# Patient Record
Sex: FEMALE | Race: WHITE
[De-identification: names, ages, dates, MRNs, and addresses within clinical notes are randomized per-mention and may not be internally consistent; named-entity substitution may affect disease eponyms.]

---
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
## 2019-01-24 NOTE — EDPHY
H & P


Time Seen by Provider: 01/24/19 04:18


HPI/ROS: 





Chief Complaint:  Back pain





HPI:  58-year-old woman who I saw earlier in the shift for complaints of back 

pain.  She is atraumatic exacerbation of her chronic back pain.  She is given 

ibuprofen and Lidoderm patch.  Patient was ambulating and pain was controlled 

in the emergency department at time of discharge.  Patient states she has been 

in the waiting room waiting for a cab in her neck pain is been getting worse.  

She says it now hurts to move and she feels she can't walk.  No new numbness or 

weakness.  No new falls.





ROS:  10 systems were reviewed and were negative except those elements noted in 

the HPI.





PMH:  Chronic back pain, anxiety





Social History: No smoking, no alcohol,  no recreational drug use





Family History: non-contributory





Physical Exam:


Gen: Awake, Alert, No Distress


HEENT:  


     Nose: no rhinorrhea


     Eyes: PERRLA, EOMI


     Mouth: Moist mucosa 


Neck: Supple, no JVD


Chest: nontender, lungs clear to auscultation


Heart: S1, S2 normal, no murmur


Abd: Soft, non-tender, no guarding


Back: no CVA tenderness, no midline tenderness, mild diffuse right lower 

tenderness without spasm


Ext: no edema, non-tender


Skin: no rash


Neuro: CN II-XII intact, Sensation grossly intact, Strength 5/5 in bilateral 

upper and lower extremities, toes are downgoing, 2+ deep tendon reflexes.  Five 

in 5 dorsi and plantar flexion.  Sensation intact in all dermatomes.








- Medical/Surgical History


Hx Asthma: No


Hx Chronic Respiratory Disease: No


Hx Diabetes: No


Hx Cardiac Disease: No


Hx Renal Disease: No


Hx Cirrhosis: No


Hx Alcoholism: No


Hx HIV/AIDS: No


Hx Splenectomy or Spleen Trauma: No





- Social History


Smoking Status: Never smoked


Constitutional: 


 Initial Vital Signs











Temperature (C)  36.3 C   01/24/19 04:23


 


Heart Rate  105 H  01/24/19 04:23


 


Respiratory Rate  16   01/24/19 04:23


 


Blood Pressure  179/91 H  01/24/19 04:23


 


O2 Sat (%)  96   01/24/19 04:23








 











O2 Delivery Mode               Room Air














Allergies/Adverse Reactions: 


 





No Known Allergies Allergy (Unverified 01/24/19 00:16)


 








Home Medications: 














 Medication  Instructions  Recorded


 


Hydrochlorothiazide  01/24/19


 


Lisinopril  01/24/19














Medical Decision Making


ED Course/Re-evaluation: 





Patient Re presenting with exacerbation of chronic back pain.  Will treat her 

with Norco here and reassess.  She has already had ibuprofen and Lidoderm patch.





Patient is still complaining of pain.  I performed a trigger-point injection 

with bupivacaine.  She has been able to stand and ambulate several steps but 

states that the pain is too great.  Will reassessed her after bupivacaine has a 

chance to take effect.





Patient is improved.  She is walking with me unassisted emergency department.  

She is completely neurologically intact.  Will discharge with follow-up with 

spine doc, she would like to go to a chiropractor which is her certainly an 

option.  Will send her with a few Norco to go.





- Data Points


Medications Given: 


 








Discontinued Medications





Hydrocodone Bitart/Acetaminophen (Norco 5/325)  2 tab PO EDNOW ONE


   Stop: 01/24/19 04:19


   Last Admin: 01/24/19 04:23 Dose:  2 tab








Departure





- Departure


Disposition: Home, Routine, Self-Care


Clinical Impression: 


 Back pain





Condition: Good


Instructions:  Back Pain (ED), Hydrocodone/Acetaminophen (By mouth)


Additional Instructions: 


Take ibuprofen, 600 mg, 3 times a day.


You may take Norco as needed for breakthrough pain.


You may replace a Lidoderm patch every 24 hr, these are available over-the-

counter.


Apply ice for 15 minutes of every hour while awake.


Make sure to remain active.  Did do not lay in bed or sit in a chair for long 

periods.  


Avoid heavy lifting or bending at work until cleared by your physician.


Please see the attached back exercise instructions.


Follow up with your primary care physician in 2-3 days for further evaluation.


Referrals: 


SHUKRI Wallace MD [Medical Doctor] - As per Instructions


Henrique Rankin MD [Medical Doctor] - As per Instructions

## 2019-02-04 ENCOUNTER — HOSPITAL ENCOUNTER (INPATIENT)
Dept: HOSPITAL 80 - FED | Age: 59
LOS: 14 days | Discharge: HOME HEALTH | DRG: 710 | End: 2019-02-18
Attending: INTERNAL MEDICINE | Admitting: INTERNAL MEDICINE
Payer: MEDICAID

## 2019-02-04 DIAGNOSIS — K68.12: ICD-10-CM

## 2019-02-04 DIAGNOSIS — L02.31: ICD-10-CM

## 2019-02-04 DIAGNOSIS — R78.81: ICD-10-CM

## 2019-02-04 DIAGNOSIS — G89.29: ICD-10-CM

## 2019-02-04 DIAGNOSIS — D64.9: ICD-10-CM

## 2019-02-04 DIAGNOSIS — E88.09: ICD-10-CM

## 2019-02-04 DIAGNOSIS — M12.869: ICD-10-CM

## 2019-02-04 DIAGNOSIS — R73.9: ICD-10-CM

## 2019-02-04 DIAGNOSIS — N17.9: ICD-10-CM

## 2019-02-04 DIAGNOSIS — I10: ICD-10-CM

## 2019-02-04 DIAGNOSIS — G06.2: ICD-10-CM

## 2019-02-04 DIAGNOSIS — E87.6: ICD-10-CM

## 2019-02-04 DIAGNOSIS — N39.0: ICD-10-CM

## 2019-02-04 DIAGNOSIS — E66.9: ICD-10-CM

## 2019-02-04 DIAGNOSIS — G93.41: ICD-10-CM

## 2019-02-04 DIAGNOSIS — M25.469: ICD-10-CM

## 2019-02-04 DIAGNOSIS — M70.22: ICD-10-CM

## 2019-02-04 DIAGNOSIS — A41.01: Primary | ICD-10-CM

## 2019-02-04 DIAGNOSIS — E86.0: ICD-10-CM

## 2019-02-04 DIAGNOSIS — G06.1: ICD-10-CM

## 2019-02-04 DIAGNOSIS — E87.1: ICD-10-CM

## 2019-02-04 DIAGNOSIS — R65.21: ICD-10-CM

## 2019-02-04 LAB
CK SERPL-CCNC: 95 IU/L (ref 0–156)
PLATELET # BLD: 432 10^3/UL (ref 150–400)

## 2019-02-04 PROCEDURE — P9016 RBC LEUKOCYTES REDUCED: HCPCS

## 2019-02-04 PROCEDURE — C1751 CATH, INF, PER/CENT/MIDLINE: HCPCS

## 2019-02-04 PROCEDURE — 0S9D3ZX DRAINAGE OF LEFT KNEE JOINT, PERCUTANEOUS APPROACH, DIAGNOSTIC: ICD-10-PCS

## 2019-02-04 PROCEDURE — A9585 GADOBUTROL INJECTION: HCPCS

## 2019-02-04 RX ADMIN — SODIUM CHLORIDE SCH MLS: 900 INJECTION, SOLUTION INTRAVENOUS at 17:49
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
## 2019-02-04 NOTE — HOSPPROG
Hospitalist Progress Note


Assessment/Plan: 


I have personally seen and evaluated Ms. Caitlin Cortes.   I agree with the 

assessment and plan as outlined by PATiny, in a separate note.  


Objective: 


 Vital Signs











Temp Pulse Resp BP Pulse Ox


 


 36.5 C   90   20   107/62   94 


 


 02/04/19 17:38  02/04/19 18:00  02/04/19 18:00  02/04/19 18:00  02/04/19 18:00








 Laboratory Results





 02/04/19 18:25 











ICD10 Worksheet


Patient Problems: 


 Problems











Problem Status Onset


 


Effusion, left knee Acute  


 


Hypokalemia Acute  


 


Hyponatremia Acute  


 


Weakness Acute

## 2019-02-04 NOTE — EDPHY
H & P





- Medical/Surgical History


Hx Asthma: No


Hx Chronic Respiratory Disease: No


Hx Diabetes: No


Hx Cardiac Disease: No


Hx Renal Disease: No


Hx Cirrhosis: No


Hx Alcoholism: No


Hx HIV/AIDS: No


Hx Splenectomy or Spleen Trauma: No


Other PMH: BACK PAIN





- Social History


Smoking Status: Never smoked


Time Seen by Provider: 02/04/19 15:25


HPI/ROS: 





CHIEF COMPLAINT: Left leg pain





HISTORY OF PRESENT ILLNESS: The patient is a 59 y/o female with a history of 

chronic back pain, hypertension, and anxiety arriving via EMS from her doctor's 

office for evaluation of left leg and knee pain. She was seen twice in our ED 2 

weeks ago for back pain and was treated with Norco, ibuprofen, Lidoderm patches

, and bupivacaine trigger point injections. Her pain has moved around since 

then and is currently located primarily along the lateral aspect of her left 

thigh and knee. Her knee pain feels "really tight, like it's almost wrapped." 

She feels she is getting weaker in addition to the pain and has been unable to 

get out of bed to even use the toilet and is now wearing Depends due to this, 

though she denies urinary or bowel incontinence. She reports decreased oral 

intake over the last 2 weeks as well. Her boss has been bringing her food. She 

says she is normally independent and drives. She saw Dr. Carbajal today for her 

first visit to establish primary care and it sounds like she was ultimately 

referred to the ED for imaging studies of some sort, but the patient cannot 

provide further information on this. She denies falls, trauma, fever, abdominal 

pain, vomiting, diarrhea. She also mentions she developed a rash while taking 

Advil and discontinued this a week ago. 





REVIEW OF SYSTEMS:


A ten system review of systems was performed and is negative with the exception 

of the items mentioned in the HPI.





Past medical history: Chronic back pain, anxiety, hypertension





Past surgical history: Noncontributory





Family history: Noncontributory





Social history: Nonsmoker, rare alcohol, no illicit drug use.  She works as a 

caregiver for an elderly person.





General Appearance:  Alert. Appears much older than chronologic age. Vital 

signs reviewed.  


Eyes:  Pupils equal and round, no conjunctival injection, no discharge. 

Anicteric.


ENT, Mouth:  Mucous membranes are moist, no oropharyngeal erythema or edema.


Neck:  No lymphadenopathy, supple.


Respiratory:  Lungs are clear to auscultation; no wheezes, rales, or rhonchi.


Cardiovascular:  Regular rate and rhythm; no murmur, rub, or gallop.


Gastrointestinal:  Abdomen is soft and nontender, no masses or organomegaly.


Skin:  Warm and dry, no rashes on exposed skin, normal color.


Back:  Nontender to palpation over the thoracolumbar spine. No CVAT.


Extremities:  Warmth along lateral left knee, left knee more edematous than 

right. Erythema along left lateral malleolus. Warmth and erythema to dorsum of 

left hand. Left olecranon bursitis. No calf tenderness or swelling.


Neurological:  Alert and oriented.  Moving all four extremities easily and 

equally.  


Cranial nerves II through XII are examined and are intact (visual acuity not 

tested).  Unable to lift either leg off the bed or hold up against gravity, 

some contraction in right quadriceps while lying in the bed. Sensation is 

intact to light touch over all 4 extremities.  Deep tendon reflexes are 2+ in 

the right and left biceps and 0 in the knees bilaterally. 


Psychiatric:  Normal affect. (Sarah Lockett)


Constitutional: 


 Initial Vital Signs











Temperature (C)  36.5 C   02/04/19 15:29


 


Heart Rate  93   02/04/19 15:29


 


Respiratory Rate  18   02/04/19 15:29


 


Blood Pressure  94/59 L  02/04/19 15:29


 


O2 Sat (%)  95   02/04/19 15:29








 











O2 Delivery Mode               Room Air














Allergies/Adverse Reactions: 


 





No Known Allergies Allergy (Unverified 02/04/19 15:35)


 








Home Medications: 














 Medication  Instructions  Recorded


 


Hydrochlorothiazide [HCTZ (*)] 25 mg PO DAILY 01/24/19


 


Lisinopril [Zestril 10 mg (*)] 10 mg PO DAILY 02/04/19














Medical Decision Making





- Diagnostics


Imaging: I viewed and interpreted images myself


Procedures: 





Procedure: Arthrocentesis.


At the request is in supervision Dr. Sarah Lockett I was asked to perform 

diagnostic arthrocentesis


Indication: Evaluation for the possibility of septic joint.  Risks, benefits, 

alternatives of the procedure were discussed with the patient and verbal 

consent obtained.  


The patient was prepped and draped in the usual sterile fashion over the left 

knee joint.  Local anesthesia was provided with 1% lidocaine with epinephrine.  

The joint space was entered with an 18 gauge gauge needle and 15 cc of straw 

fluid was obtained.  There were no complications.  Synovial fluid sent to 

laboratory for evaluation.


The procedure was performed by myself. (RONDA Kelley)


ED Course/Re-evaluation: 


This is a 59 y/o female with a history of chronic back pain who presents 

primarily with left leg and knee pain and a 2-week history of weakness. She has 

warmth and erythema to the lateral aspect of her left knee on exam. She is 

quite weak and is either unable or unwilling to lift her legs off the bed. Plan 

for IV, labs, UA, knee x-ray. 





Consulted with Dr. Carbajal, patient's PCP. He sent patient to the ED due to 

concern for gout or infection in her right knee.





Left knee x-ray: effusion





WBC elevated at 39. Hyponatremic at 121, hypokalemic at 3.0. Chest x-ray 

ordered. Plan for slow fluid repletion and admission. 


She shows no signs of encehalopathy.  





Chest x-ray: nothing acute. 





Consulted with Dr. Barnes, hospitalist. He accepts admission. Plan for joint 

aspiration in ED to evaluate for septic arthritis vs gout. 





1730: BP 83/59. 


Lactate 2.6.


She meets sepsis criteria, but decision has been made, in discussion with 

admitting team, to hydrate gently because of her hyponatremia and risk of CPM.  

She is clinically volume depleted, I suspect that her low blood pressure is 

primarily volume depletion.  Admitting team aware that we are deviating from 

sepsis protocol.





Joint aspiration of right knee performed by RAJEEV Kelley. 


UA indicates infection. At this point, there appear to be two possible sources 

of infection--urine and knee.  She continues to complain of back pain and leg 

weakness.  Her weakness could be due to electrolyte abnormalities (low sodium 

and low potassium) but the possibility of other etiologies must be considered--

herniated disc, discitis, transverse myelitis, spinal cord compression from 

abscess).  Will discuss antibiotics with hospitalist. 





Patient is being admitted to SDU. 


1800:  /62.


2005: BP 86/52. Mentating normally. IVF infusing at rate ordered by hospitalist 

service. If she continues with hypotension will need to consider alternate IVF 

rate and/or pressors. (Sarah Lockett)


Critical Care Time: 





This patient received 60 minutes of critical care, exclusive of procedures 

performed and exclusive or care provided by physician assistant..  She was at 

risk of multi-system failure. (Sarah Lockett)





- Data Points


Laboratory Results: 


 Laboratory Results





 02/04/19 16:30 





 02/04/19 16:30 








Medications Given: 


 





Enoxaparin Sodium (Lovenox)  40 mg SC DAILY [facility]


   Stop: 08/04/19 10:59


   Last Admin: 02/05/19 11:24 Dose:  40 mg


Hydromorphone HCl (Dilaudid)  0.2 - 0.4 mg IVP Q4HRS PRN


   PRN Reason: Pain, Severe Unable to Take PO


   Stop: 02/14/19 19:10


   Last Admin: 02/07/19 07:52 Dose:  0.4 mg


Sodium Chloride (Ns)  1,000 mls @ 100 mls/hr IV CONT [facility]


   Stop: 08/03/19 17:44


   Last Admin: 02/09/19 09:04 Dose:  1,000 mls


Nafcillin Sodium 2 gm/ (Dextrose)  100 mls @ 100 mls/hr IV Q4HRS [facility]


   PRN Reason: Protocol


   Stop: 03/07/19 21:59


   Last Admin: 02/09/19 10:10 Dose:  100 mls


Methocarbamol 750 mg/ Sodium (Chloride)  57.5 mls @ 230 mls/hr IV Q8HRS PRN


   PRN Reason: SPASMS, unable to take po


   Stop: 08/06/19 08:07


   Last Admin: 02/07/19 08:53 Dose:  57.5 mls


Methocarbamol (Robaxin)  750 mg PO TID PRN


   PRN Reason: Spasms


   Stop: 08/05/19 21:59


   Last Admin: 02/07/19 21:31 Dose:  750 mg


Oxycodone HCl (Oxycodone Ir)  5 - 10 mg PO Q3HRS PRN


   PRN Reason: Pain, Severe Able to Take PO


   Stop: 02/14/19 19:10


   Last Admin: 02/09/19 08:36 Dose:  10 mg


Senna/Docusate Sodium (Senokot-S)  1 - 2 tab PO BID [facility]


   PRN Reason: Protocol


   Stop: 08/06/19 20:59


   Last Admin: 02/09/19 08:51 Dose:  Not Given





Discontinued Medications





Bacitracin (Bacitracin Syringe) Confirm Administered Dose 100,000 units IRR .STK

-MED ONE


   Stop: 02/06/19 12:20


   Last Admin: 02/06/19 13:02 Dose:  100,000 units


Bacitracin (Bacitracin Syringe) Confirm Administered Dose 100,000 units IRR .STK

-MED ONE


   Stop: 02/06/19 13:56


   Last Admin: 02/06/19 13:02 Dose:  100,000 units


Bupivacaine HCl (Sensorcaine 0.25% Sdv) Confirm Administered Dose 60 ml .ROUTE 

.STK-MED ONE


   Stop: 02/06/19 12:19


   Last Admin: 02/06/19 13:00 Dose:  20 ml


Cefazolin Sodium (Ancef Syringe) Confirm Administered Dose 1 gm .ROUTE .STK-MED 

ONE


   Stop: 02/06/19 13:56


   Last Admin: 02/06/19 13:02 Dose:  1 gm


Cefazolin Sodium (Ancef Syringe) Confirm Administered Dose 1 gm .ROUTE .STK-MED 

ONE


   Stop: 02/06/19 15:05


   Last Admin: 02/06/19 15:09 Dose:  1 gm


Chlorhexidine Gluconate (Hibiclens) Confirm Administered Dose 1 btl TP .STK-MED 

ONE


   Stop: 02/06/19 12:19


   Last Admin: 02/06/19 15:00 Dose:  1 btl


Diazepam (Valium)  5 mg PO ONCE ONE


   Stop: 02/05/19 19:46


   Last Admin: 02/05/19 20:00 Dose:  5 mg


Epinephrine HCl (Epinephrine) Confirm Administered Dose 1 mg .ROUTE .STK-MED ONE


   Stop: 02/06/19 12:19


   Last Admin: 02/06/19 13:00 Dose:  0.3 mg


Fentanyl (Sublimaze)  25 - 100 mcg IVP Q5M PRN


   PRN Reason: PACU, IMMEDIATE Pain control


   Stop: 02/06/19 17:44


   Last Admin: 02/06/19 17:08 Dose:  50 mcg


Fentanyl (Sublimaze)  0 mcg IVP ONCALL PRN


   PRN Reason: Per provider during procedure


   Stop: 02/07/19 11:13


   Last Admin: 02/07/19 14:08 Dose:  200 mcg


Gentamicin Sulfate (Garamycin) Confirm Administered Dose 160 mg .ROUTE .STK-MED 

ONE


   Stop: 02/06/19 13:55


   Last Admin: 02/06/19 13:02 Dose:  160 mg


Gentamicin Sulfate (Garamycin) Confirm Administered Dose 80 mg .ROUTE .STK-MED 

ONE


   Stop: 02/06/19 14:32


   Last Admin: 02/06/19 15:03 Dose:  Not Given


Ceftriaxone Sodium/Dextrose (Rocephin 1 Gm (Premix))  50 mls @ 100 mls/hr IV 

DAILY [facility]


   PRN Reason: Protocol


   Stop: 03/06/19 18:59


   Last Admin: 02/05/19 11:26 Dose:  50 mls


Vancomycin HCl 1.25 gm/ (Dextrose)  250 mls @ 166.67 mls/hr IV Q12H ONE


   PRN Reason: Protocol


   Stop: 02/05/19 10:04


   Last Admin: 02/05/19 09:13 Dose:  250 mls


Sodium Chloride (Ns)  1,000 mls @ 3,000 mls/hr IV ONCE ONE


   Stop: 02/05/19 09:15


   Last Admin: 02/05/19 09:31 Dose:  1,000 mls


Sodium Chloride (Ns)  500 mls @ 1,500 mls/hr IV ONCE ONE


   Stop: 02/05/19 11:10


   Last Admin: 02/05/19 11:15 Dose:  500 mls


Cefazolin Sodium/Dextrose (Ancef)  100 mls @ 200 mls/hr IV Q8HRS [facility]


   PRN Reason: Protocol


   Stop: 03/07/19 13:59


   Last Admin: 02/05/19 21:37 Dose:  100 mls


Cefazolin Sodium/Dextrose (Ancef)  100 mls @ 200 mls/hr IV ONCALL ONE


   PRN Reason: Protocol


   Stop: 02/06/19 08:58


   Last Admin: 02/06/19 14:59 Dose:  Not Given


Midazolam HCl (Versed)  0 mg IVP ONCALL PRN


   PRN Reason: Per provider during procedure


   Stop: 02/07/19 11:13


   Last Admin: 02/07/19 13:15 Dose:  4 mg


Potassium Chloride (Klor Packets)  40 meq PO ONCE ONE


   Stop: 02/04/19 19:04


   Last Admin: 02/04/19 22:39 Dose:  40 meq


Potassium Chloride (Klor-Con)  40 meq PO ONCE ONE


   PRN Reason: Protocol


   Stop: 02/05/19 06:58


   Last Admin: 02/05/19 09:13 Dose:  40 meq


Potassium Chloride (Klor-Con)  20 meq PO ONCE ONE


   PRN Reason: Protocol


   Stop: 02/05/19 22:00


   Last Admin: 02/05/19 22:33 Dose:  20 meq


Potassium Chloride (Klor-Con)  20 meq PO ONCE ONE


   PRN Reason: Protocol


   Stop: 02/07/19 20:04


   Last Admin: 02/07/19 21:31 Dose:  20 meq


Potassium Chloride (Klor-Con)  20 meq PO ONCE ONE


   PRN Reason: Protocol


   Stop: 02/08/19 07:56


   Last Admin: 02/08/19 08:40 Dose:  20 meq


Potassium Chloride (Klor-Con)  20 meq PO ONCE ONE


   PRN Reason: Protocol


   Stop: 02/08/19 21:57


   Last Admin: 02/08/19 22:06 Dose:  20 meq


Thrombin (Thrombin-Jmi) Confirm Administered Dose 5,000 unit TP .STK-MED ONE


   Stop: 02/06/19 12:19


   Last Admin: 02/06/19 13:00 Dose:  5,000 unit


Vancomycin HCl (Vancomycin Hcl) Confirm Administered Dose 500 mg IV .STK-MED ONE


   Stop: 02/06/19 15:50


   Last Admin: 02/06/19 15:53 Dose:  500 mg








Departure





- Departure


Disposition: [facility] Inpatient Acute


Clinical Impression: 


 Effusion, left knee, Hyponatremia, Hypokalemia, Weakness





Leukocytosis


Qualifiers:


 Leukocytosis type: other Qualified Code(s): D72.828 - Other elevated white 

blood cell count





UTI (urinary tract infection)


Qualifiers:


 Urinary tract infection type: site unspecified Hematuria presence: without 

hematuria Qualified Code(s): N39.0 - Urinary tract infection, site not specified





Condition: Fair


Report Scribed for: Sarah Lockett


Report Scribed by: Chantal Garcia


Date of Report: 02/04/19


Time of Report: 16:02


Physician Review and Approval Statement: 





02/04/19 15:25


Portions of this note were transcribed by the medical scribe.  I, Dr. Sarah Lockett, personally performed the history, physical exam, and medical decision-

making; and confirmed the accuracy of the information in the transcribed note. (

Sarah Lockett)

## 2019-02-05 LAB — PLATELET # BLD: 368 10^3/UL (ref 150–400)

## 2019-02-05 RX ADMIN — Medication SCH MLS: at 21:37

## 2019-02-05 RX ADMIN — OXYCODONE HYDROCHLORIDE PRN MG: 15 TABLET ORAL at 20:49

## 2019-02-05 RX ADMIN — Medication SCH MLS: at 14:20

## 2019-02-05 RX ADMIN — OXYCODONE HYDROCHLORIDE PRN MG: 15 TABLET ORAL at 14:20

## 2019-02-05 RX ADMIN — NAFCILLIN SCH MLS: 2 INJECTION, POWDER, FOR SOLUTION INTRAMUSCULAR; INTRAVENOUS at 22:33

## 2019-02-05 RX ADMIN — HYDROMORPHONE HYDROCHLORIDE PRN MG: 1 INJECTION, SOLUTION INTRAMUSCULAR; INTRAVENOUS; SUBCUTANEOUS at 18:30

## 2019-02-05 RX ADMIN — HYDROMORPHONE HYDROCHLORIDE PRN MG: 1 INJECTION, SOLUTION INTRAMUSCULAR; INTRAVENOUS; SUBCUTANEOUS at 18:40

## 2019-02-05 RX ADMIN — OXYCODONE HYDROCHLORIDE PRN MG: 15 TABLET ORAL at 06:40

## 2019-02-05 RX ADMIN — OXYCODONE HYDROCHLORIDE PRN MG: 15 TABLET ORAL at 09:25
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
## 2019-02-05 NOTE — PCMIDPN
Assessment/Plan: 


ID addendum


Contacted by radiology about extensive abn found on MRI including epidural 

abscess T12-L5, with most severe compression at L3-4. B iliopsoas abscess and 

paraspinal abscess L3 to S1. Large 10 cm abscess extending from L sacrum to the 

level of lesser trochanter


--DC cefazolin


--start Nafcillin for CNS penetration 


--neurosurg consulted (patient refuses surgery tonight, wants to wait till 

tomorrow and she was eating at time of all these results)


--likely needs gen surg as well for complete debridement


--patient made NPO after midnight





Objective: 


 Vital Signs











Temp Pulse Resp BP Pulse Ox


 


 37.1 C   88   25 H  96/53 L  93 


 


 02/05/19 20:00  02/05/19 20:00  02/05/19 20:00  02/05/19 20:00  02/05/19 20:00








 Microbiology











 02/04/19 18:23 Gram Stain - Final





 Knee - Aspirate Body Fluid Culture - Final


 


 02/04/19 18:25 Blood Panel (PCR) - Final





 Blood    S.aureus Methicillin Suscept.


 


 02/04/19 18:25 Respiratory Panel (PCR) - Final





 Nasal, Sinus - [place] Viral Transport    No Organism Detected By Pcr








 Laboratory Results





 02/05/19 04:58 





 02/05/19 18:00 





 











 02/04/19 02/05/19 02/06/19





 05:59 05:59 05:59


 


Intake Total  2200 1800


 


Output Total  450 1000


 


Balance  1750 800








 











ESR  72 MM/HR (0-30)  H  02/05/19  04:58    


 


C-Reactive Protein  204.9 mg/L (<10.0)  H  02/05/19  04:58    














ICD10 Worksheet


Patient Problems: 


 Problems











Problem Status Onset


 


Effusion, left knee Acute  


 


Hypokalemia Acute  


 


Hyponatremia Acute  


 


Leukocytosis Acute  


 


UTI (urinary tract infection) Acute  


 


Weakness Acute

## 2019-02-05 NOTE — HOSPPROG
Hospitalist Progress Note


Assessment/Plan: 





DIAGNOSES: 


* Acute sepsis with organ failure is strongly suspected


* Ongoing hypotension


* Gram-positive bacteremia, Staph suspected


* Acute kidney injury


* Acute Lumbar pain, bilateral leg pains diffuse but involved joints


* Left knee effusion with some white blood cells, uncertain etiology


   -white count 7500 is not typical of infection but cannot rule out infection 

at this time


* Hyponatremia, appears probably mildly volume depleted and has low urine sodium


* Hypokalemia


* Pyuria of undetermined significance with no symptoms


* Generalized weakness and deconditioning due to above


* Chronic back pain





At this point a strongly suspect that her whole presenting picture is a staph 

infection.  As she is afebrile despite multiple signs of significant illness, 

would be suspicious for lumbar spine infection, endocarditis, or other similar 

process that can high fever.





PLANS:


* IV vancomycin started pending cultures


* Will stop the Rocephin for this time being


* Await for final blood culture results


* Will see if the lab is able to do cultures from the synovial fluid at this 

point


* Aggressive fluid resuscitation will need to continue at this time with 

hypotension persisting


* Follow sodium closely with goal of no more than 10 point per 24 hr increase; 

at the moment with probable volume depletion she is also likely body sodium 

depleted so will continue with saline as a method of replacing her sodium and 

restrict oral fluid to a degree until we make better progress


* Follow renal function closely


* Increase activity as able


* Potassium replacement carefully with her renal injury





Seen by me today on hospitalist rounds as well as ICU multi discipline rounds


Reviewed in detail with Dr. Lynn





SUBJECTIVE:


Remains having ongoing lumbar pain and pain in both legs


Does not mention elbow or wrist or shoulder discomfort this morning


No shortness of breath


No nausea


No chills





OBJECTIVE


Vitals reviewed:  Remains hypotensive, no fever so far; there was some 

tachypnea during the night which has resolved, the cause for this is unclear


Cardiac Monitor, my review:  Sinus





Exam:


alert oriented 


skin warm dry color ok


resps not labored


lungs clear BSs


heart regular no murmur heard


abd soft nondistended nontender, bowel sounds present


limbs warm, no edema; I do not see any splinters or other signs of embolic 

events


Decreased range of motion of left knee passive or active due to pain though 

interestingly the knee is not red or warm and there is really little effusion 

present at this time





iv site ok





Lab data:


Synovial fluid from left knee yesterday had 7500 white cells, neutrophil 

predominant (as best I can tell this fluid has not been cultured)


White count down to 33,000 but remains quite elevated


Hemoglobin a bit lower at 10,000





Microbiology:


Cultures of blood:  Gram-positive cocci in clusters growing from anaerobic 

bottles of both sets


I do not see evidence of synovial fluid cultures


Objective: 


 Vital Signs











Temp Pulse Resp BP Pulse Ox


 


 36.6 C   75   18   84/47 L  97 


 


 02/05/19 07:44  02/05/19 07:44  02/05/19 07:44  02/05/19 07:44  02/05/19 07:44








 Microbiology











 02/04/19 18:25 Respiratory Panel (PCR) - Final





 Nasal, Sinus - [place] Viral Transport    No Organism Detected By Pcr








 Laboratory Results





 02/05/19 04:58 





 02/05/19 04:58 





 











 02/04/19 02/05/19 02/06/19





 06:59 06:59 06:59


 


Intake Total  2200 


 


Output Total  450 


 


Balance  1750 














- Time Spent With Patient


Time Spent with Patient: greater than 35 minutes


Time Spent with Patient: Greater than 35 minutes spent on this patients care, 

greater than 50% of time spent counseling, educating, and coordinating care 

regarding the above mentioned plan.





ICD10 Worksheet


Patient Problems: 


 Problems











Problem Status Onset


 


Effusion, left knee Acute  


 


Hypokalemia Acute  


 


Hyponatremia Acute  


 


Leukocytosis Acute  


 


UTI (urinary tract infection) Acute  


 


Weakness Acute

## 2019-02-05 NOTE — ECHO
[email or web address]





[address] [address] 

Main: [phone] 



Fax: 



Transthoracic Echocardiogram 

Name:             MIREILLE BETH                       MR#:

[id number]

Study Date:       2019                            Study Time:

01:19 PM

YOB: 1960                            Age:

58 year(s)

Height:           160 cm (63 in.)                       Weight:

79.38 kg (175 lb.)

BSA:              1.83 m2                               Gender:

Female

Examination:      Echo                                  Indication:

MSSA bacteremia, no murmur but concern for



endocarditis; r/o vegetation 

Image Quality:    Technically Difficult                 Contrast: 

Requested by:     Cristal Heard                           BP:

84 mmHg/54 mmHg

Heart Rate:                                             Rhythm: 

Indication:       MSSA bacteremia, no murmur but concern for

endocarditis; r/o vegetation



Procedure Staff 

Ultrasound Technician:   [facility] 

Reading Physician:       Jose Hess MD 

Requesting Provider: 



Conclusions:          Normal size left ventricle.  

Mild concentric LV hypertrophy.  

Normal global systolic LV function.  

EF is 57 %.  

No regional wall motion abnormality.  

Normal size right ventricle.  

The left atrium is normal in size.  

The right atrium is normal in size.  

The anterior mitral leaflet is mildly thickened. No obvious vegetation

seen. Trace to mild MR and no

stenosis.  

The aortic valve is tri-leaflet.  

There is no aortic valve regurgitation.  

No aortic valve stenosis is present.  

Tricuspid valve not well visualized.  

Mild tricuspid regurgitation is present.  

Right ventricular systolic pressure measures 39mmHg.  

The pulmonary artery pressure is mildly increased.  

Pulmonary valve not well visualized.  

Normal size ascending aorta measuring 3.3 cm.  

Normal size and course of the IVC. 



Measurements: 

Chambers                    Valvular Assessment AV/MV

Valvular Assessment TV/PV



Normal                                  Normal

Normal

Name         Value     Range              Name         Value Range

Name          Value Range

Ao Evita (MM): 3.3 cm    (2.2 cm-3.7            AV Vmax:     1.64 m/s (1

m/s-1.7       TR Vmax:      2.92 mm/s ( - )



cm)                                 m/s)              TR PGmax:     34

mmHg ( - )

IVSd (2D):   1.1 cm (0.6 cm-1.1               AV maxP mmHg ( -

)         syst. PAP: 39 mmHg ( - )



cm)   



Patient: MIREILLE BETH                     MRN: [id number]

Study Date: 2019   Page 1 of 2

01:19 PM 









LVDd (2D): 4.4 cm (3.9 cm-5.3 LVOT Vmax: 1.10 m/s (0.7 m/s-1.1 PV

Vmax: 0.86 m/s (0.6 m/s-0.9

cm)                                 m/s)

m/s)



LVDs (2D):   3.1 cm    (2.1 cm-4              LUCILLE (Vmax):  2.1 cm2 ( -

)         PV PGmax:     3  mmHg ( - )

cm)                MV E Vmax:   0.55 m/s ( - )  



LVPWd (2D):  1.1 cm    ( - )              MV A Vmax:   0.76 m/s ( - )



LVOTd        2.0 cm    2.0 cm mm              MV E/A:      0.72 ( - )



LVEF (2D):   57        (>=54 %)   



Continued Measurements: 

Chambers                      Valvular Assessment AV/MV

Valvular Assessment TV/PV



Name                       Value          Name

Value   Name                      Value

LADs:                    2.8 cm               MV DecTime:

232 m/s     CVP (est.):              5 mmHg

LADs Lon.9 cm               MV E/E' Septal:

6.10

LA Area:                 17.6 cm2         MV E/E' Lateral:       6.80





Additional Vessels  



Name                       Value  

Ao Ascending:            3.3 cm    



Findings:             Left Ventricle: 

Normal size left ventricle. Mild concentric LV hypertrophy. Normal

global systolic LV function. EF is

57 %. No regional wall motion abnormality. Normal diastolic LV

function.

Right Ventricle: 

Normal size right ventricle. Normal RV function.  

Left Atrium: 

The left atrium is normal in size.  

Right Atrium: 

The right atrium is normal in size.  

Mitral Valve: 

The anterior mitral leaflet is mildly thickened. No obvious vegetation

seen. Trace to mild MR and no

stenosis.  

Aortic Valve: 

The aortic valve is tri-leaflet. There is no aortic valve

regurgitation. No aortic valve stenosis is

present. There is no aortic valve vegetation.  

Tricuspid Valve: 

Tricuspid valve not well visualized. Mild tricuspid regurgitation is

present. Right ventricular systolic

pressure measures 39mmHg. The pulmonary artery pressure is mildly

increased. No tricuspid valve

vegetation.  

Pulmonic Valve: 

Pulmonary valve not well visualized.  

Aorta: 

Normal size aortic root measuring 3.3 cm. Normal size ascending aorta

measuring 3.3 cm.

IVC: 

Normal size and course of the IVC.  

Pericardium: 

No pericardial effusion. 







Electronically signed by Jose Hess MD on 2019 at 04:25 PM 

(No Signature Object) 



Patient: MIREILLE BETH                     MRN: [id number]

Study Date: 2019   Page 2 of 2

01:19 PM 







[id number]
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
## 2019-02-05 NOTE — ASMTCMCOM
CM Note

 

CM Note                       

Notes:

Pt is a 59 yo F presents with hyponatremia, dehydration and elevated WBC.  ID is consulted. MRI 

scheduled. Referral to [facility] submit.  Dispo needs TBD at this time. 



CM to follow. 



Plan: TBD

 

Date Signed:  02/05/2019 11:18 AM

Electronically Signed By:PIEDAD Can

## 2019-02-06 LAB — PLATELET # BLD: 369 10^3/UL (ref 150–400)

## 2019-02-06 PROCEDURE — 00NY0ZZ RELEASE LUMBAR SPINAL CORD, OPEN APPROACH: ICD-10-PCS | Performed by: NEUROLOGICAL SURGERY

## 2019-02-06 PROCEDURE — 00NX0ZZ RELEASE THORACIC SPINAL CORD, OPEN APPROACH: ICD-10-PCS | Performed by: NEUROLOGICAL SURGERY

## 2019-02-06 PROCEDURE — 4A1004G MONITORING OF CENTRAL NERVOUS ELECTRICAL ACTIVITY, INTRAOPERATIVE, OPEN APPROACH: ICD-10-PCS | Performed by: NEUROLOGICAL SURGERY

## 2019-02-06 PROCEDURE — 3E1S38X IRRIGATION OF EPIDURAL SPACE USING IRRIGATING SUBSTANCE, PERCUTANEOUS APPROACH, DIAGNOSTIC: ICD-10-PCS | Performed by: NEUROLOGICAL SURGERY

## 2019-02-06 RX ADMIN — NAFCILLIN SCH MLS: 2 INJECTION, POWDER, FOR SOLUTION INTRAMUSCULAR; INTRAVENOUS at 18:41

## 2019-02-06 RX ADMIN — NAFCILLIN SCH MLS: 2 INJECTION, POWDER, FOR SOLUTION INTRAMUSCULAR; INTRAVENOUS at 15:08

## 2019-02-06 RX ADMIN — HYDROMORPHONE HYDROCHLORIDE PRN MG: 1 INJECTION, SOLUTION INTRAMUSCULAR; INTRAVENOUS; SUBCUTANEOUS at 09:15

## 2019-02-06 RX ADMIN — NAFCILLIN SCH MLS: 2 INJECTION, POWDER, FOR SOLUTION INTRAMUSCULAR; INTRAVENOUS at 11:11

## 2019-02-06 RX ADMIN — Medication PRN MCG: at 17:08

## 2019-02-06 RX ADMIN — SODIUM CHLORIDE SCH MLS: 900 INJECTION, SOLUTION INTRAVENOUS at 00:29

## 2019-02-06 RX ADMIN — NAFCILLIN SCH MLS: 2 INJECTION, POWDER, FOR SOLUTION INTRAMUSCULAR; INTRAVENOUS at 01:57

## 2019-02-06 RX ADMIN — HYDROMORPHONE HYDROCHLORIDE PRN MG: 1 INJECTION, SOLUTION INTRAMUSCULAR; INTRAVENOUS; SUBCUTANEOUS at 17:49

## 2019-02-06 RX ADMIN — NAFCILLIN SCH MLS: 2 INJECTION, POWDER, FOR SOLUTION INTRAMUSCULAR; INTRAVENOUS at 21:04

## 2019-02-06 RX ADMIN — NAFCILLIN SCH MLS: 2 INJECTION, POWDER, FOR SOLUTION INTRAMUSCULAR; INTRAVENOUS at 06:19

## 2019-02-06 RX ADMIN — HYDROMORPHONE HYDROCHLORIDE PRN MG: 1 INJECTION, SOLUTION INTRAMUSCULAR; INTRAVENOUS; SUBCUTANEOUS at 01:53

## 2019-02-06 RX ADMIN — OXYCODONE HYDROCHLORIDE PRN MG: 15 TABLET ORAL at 19:20

## 2019-02-06 RX ADMIN — Medication PRN MCG: at 16:59

## 2019-02-06 RX ADMIN — OXYCODONE HYDROCHLORIDE PRN MG: 15 TABLET ORAL at 11:22

## 2019-02-06 RX ADMIN — SODIUM CHLORIDE SCH MLS: 900 INJECTION, SOLUTION INTRAVENOUS at 18:42

## 2019-02-06 NOTE — GOP
[f 
rep st]



                                                                OPERATIVE REPORT





DATE OF OPERATION:  02/06/2019



SURGEON:  Vincent Tillman MD



NEUROSURGEON:  Vincent Tillman MD.



ASSISTANT:  Betty Espinoza PA-C.



ANESTHESIA:  General endotracheal.



PREOPERATIVE DIAGNOSIS:  Holospinal epidural abscess, worst at T9-10 and L3-4, 
with severe compression of the cauda equina at the L3-4 level.



POSTOPERATIVE DIAGNOSIS:  Holospinal epidural abscess, worst at T9-10 and L3-4, 
with severe compression of the cauda equina at the L3-4 level.



PROCEDURE PERFORMED:  

1.  T4-5 laminectomy for decompression of spinal cord and washout of epidural 
abscess.

2.  T9-10 laminectomy for decompression of spinal cord and washout of epidural 
abscess.

3.  L3-4 complete laminectomy for decompression of cauda equina and washout of 
epidural abscess.

4.  Use of the operative microscope.

5.  Intraoperative neurophysiologic monitoring, including somatosensory-evoked 
potentials, motor-evoked potentials and EMG.



FINDINGS:  Successful washout of epidural abscess.



SPECIMENS:  

1.  Thoracic epidural abscess for cultures.

1.  Lumbar epidural abscess for cultures.



2.  ESTIMATED BLOOD LOSS:  Blood loss was 100 mL.



DESCRIPTION OF PROCEDURE:  After informed consent was obtained from the patient
, the patient was brought to the operating room and a formal time-out was 
performed, identifying the patient by name, medical record number and date of 
birth.  Preop antibiotics were given.  The endotracheal tube was placed and 
general endotracheal anesthesia was smoothly induced.  All appropriate leads 
were placed for intraoperative neurophysiologic monitoring.  The patient was 
turned to the prone position on the Earnest frame.  At this point in time, using 
spinal needles and AP and lateral radiographs, we confirmed the levels of 3 
separate incisions, one for T4-5, one for T9-10 and one for L3-4.  After these 
incisions were marked, the thoracolumbar region was prepped and draped in the 
normal sterile fashion.  0.25% Marcaine with epinephrine was infiltrated in the 
skin for hemostasis and postoperative analgesia.  Starting at the top, a skin 
incision was made at T4-5 and the fascia was opened in the midline.  The 
paraspinous muscles were taken down from the spinous processes and lamina and 
self-retaining retractors were placed.  The same opening was performed at the T9
-10 incision, again exposing the lamina and spinous processes.  Lateral 
radiographs confirmed the appropriate levels of T4-5 and T9-10.  The spinous 
processes were then removed using Leksell rongeurs and high-speed drill with a 
4 mm cutting bur was used to perform a laminectomy at T4-5 and T9-10.  
Immediately after the lamina was drilled through, purulent material was 
expressed from the epidural space.  The yellow ligament was removed completely, 
decompressing the thecal sac.  Once this was decompressed and a lot of the 
purulent material had been already expressed under its own pressure, a small 
lumbar drain catheter was passed in the epidural space cephalad from the T4-5 
incision and the epidural space was washed using irrigation with bacitracin, 
gentamicin and Ancef.  This catheter was also passed from the upper thoracic to 
the lower thoracic incision and caudal from the lower incision, washing the 
complete epidural space.  This allowed good relaxation of the dura and we did 
not think there was any longer spinal cord compression.  The tissues were quite 
friable and oozing, but hemostasis was obtained with bipolar electrocautery and 
some Surgicel.  



The motor-evoked potentials remained completely stable.  



The lumbar incision was opened and again the fascia was opened in the midline.  
The paraspinous muscles were taken down from the spinous processes and lamina 
at L3-4.  Again, the level was confirmed using a lateral radiograph.  The 
spinous processes of L3 and L4 were removed and complete laminectomy was 
drilled using the high-speed drill.  Again, once this was drilled, purulent 
material was expressed under pressure.  The yellow ligament was removed 
completely decompressing the thecal sac and the nerve roots.  After the 
laminectomies were then performed, the thecal sac was retracted medially at L3-
4 and a lot of purulent material was expressed from anterior to the thecal sac 
as well, which likely represented the anterior epidural hematoma.  At this point
, the wound was copiously irrigated using bacitracin irrigation.  Again, the 
lumbar drain catheter was used to irrigate the epidural space with the triple-
antibiotic irrigation.  At this point, all bleeding was controlled using 
bipolar electrocautery and some Surgicel.  1 g of vancomycin powder was then 
divided between the 3 wounds.  At this point, a 10-Romanian PASQUALE drain was placed 
in the lumbar wound.  Hemostasis was obtained of all the wounds using bipolar 
electrocautery.  The fascia was then closed in the midline using interrupted 0 
Vicryl.  The deep dermis was closed using interrupted 2-0 Vicryl and the skin 
was closed using Steri-Strips.  The patient was then turned back in the supine 
position, where she was extubated and transferred to the PACU in stable 
condition.  All sponge and needle counts were correct at the end of the case.



BRIEF CLINICAL HISTORY:  The patient is a 58-year-old woman who presented with 
back pain and progressive infection.  Her white count was found to be elevated 
quite a bit and she has been on self-imposed bedrest for 2 weeks, as she was 
having difficulty walking with her leg strength.  Lumbar MRI was completed last 
night, which revealed epidural abscess, worst at L3-4, but extending anteriorly 
up to the thoracolumbar junction and posteriorly from L3-4 down to the L5 
lamina.  There was severe compression of the cauda equina.  She was offered 
surgery at that time, but declined, preferring to wait till today.  Today, 
given her proximal leg weakness, the thoracic MRI was also completed, which 
showed dorsal epidural abscess extending from T2 down to T11.  I discussed 
these options with her and we had planned L3-4 laminectomy, but also decided to 
skip laminectomies and thoracic spine to wash out this abscess and decompress 
the spinal cord.



TOTAL FLUIDS AND URINE OUTPUT:  Per the Anesthesia record.  



All neurophysiologic monitoring was stable throughout the case.



DRAINS:  Lumbar PASQUALE.





Job #:  [id number]

MTDD
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
## 2019-02-06 NOTE — GCON
[f rep st]



                                                                    CONSULTATION





NEUROSURGICAL CONSULTATION





CHIEF COMPLAINT:  Bilateral lower extremity weakness. 



Ms. Cortes is a 58-year-old female with a history of hypertension, who was admitted on February 4, 201
9, secondary to low back pain as well as bilateral lower extremity weakness as well as left hip pain,
 left knee pain.  She was seen in the ER approximately 2 weeks ago complaining of low back pain as we
ll at that time and was discharged with Lidoderm patch and ibuprofen. 



Recently, the patient has been unable to mobilize well and has been in bed since for the past week at
 least.  She returned to the emergency department with worsening right hip pain and back pain as well
 as left hip and knee pain, and workup demonstrated an extremely high white blood cell count of 39.37
.  Blood cultures were obtained and grew 2 sets of MSSA in less than 24 hours. 



The patient has been started on nafcillin by Infectious Disease.  Neurosurgery was consulted secondar
y to bilateral lower extremity weakness. 



Currently, the patient states her pain is well controlled as long as she lies flat and still.  Howeve
r, when she goes to move, she has significant increase in her back pain.  She reports ongoing bilater
al lower extremity weakness and is unable to lift her legs off the bed or pull her knees up to her ch
est.  She denies incontinence of bowel and bladder, but has not had a BM for 2 weeks per the patient 
report.



ALLERGIES:  No known drug allergies.



PAST MEDICAL HISTORY:  Hypertension, rhinoplasty, chronic low back pain.



SOCIAL HISTORY:  The patient lives alone.  She is employed.  She does not smoke.  She does not drink,
 and she denies IV drug use.



HOME MEDICATIONS:  Hydrochlorothiazide 25 mg p.o. daily.  Lisinopril 10 mg p.o. daily.



REVIEW OF SYSTEMS:  Back pain, bilateral hip pain, left knee pain, right wrist pain, otherwise negati
ve other than mentioned in the HPI.



PHYSICAL EXAM:  GENERAL:  A 58-year-old female lying flat in the hospital bed, in no apparent distres
s.  HEAD, EYES, EARS, NOSE, and THROAT:  Within normal limits.  ABDOMEN:  Soft, nontender, nondistend
ed.  She is obese.  NEUROLOGIC:  Patient is awake, alert, and oriented x4.  Cranial nerves 2-12 are i
ntact to gross examination.  Speech is fluent.  Tongue is midline.  Spinal accessory muscles are inta
ct.  She has 5/5 strength in all muscle groups of the right upper extremity.  In the left upper extre
mity, she has 5-/5 strength in the left deltoid, otherwise 5/5 strength in the left upper extremity. 
 Bilateral lower extremities demonstrate 1/5 strength in the bilateral iliopsoas, quads, and hamstrin
gs, with 4+/5 in bilateral plantar flexor and dorsiflexor.  Reflexes are 1/4 at the right biceps and 
brachial radialis, and left biceps and brachial radialis with positive Hart's on the left side.  P
atellar tendon reflexes are absent.  There is no clonus.  



Sensation is grossly intact to light touch in all dermatomal distributions of the bilateral upper and
 lower extremities, and the patient denies any saddle paresthesias.



DATA REVIEW:  Today's labs from 02/06/2019, demonstrate a white blood cell count of 30.87, hemoglobin
 of 10.2, hematocrit 29.4, platelets are 369,000.  Absolute seg neutrophils are 27.04.  Sodium is 124
, potassium is 4.1, chloride 93, carbon dioxide 24, BUN is 29, creatinine 0.8, glucose is 128.  Blood
 cultures demonstrate Staph aureus, methicillin susceptible.  Catheterized urine sample demonstrates 
Staph aureus. 



Knee aspirate demonstrates 4+ polymorphonuclear white cells with no organisms and no aerobic growth a
t 24 hours. 



MRI of lumbar spine with and without contrast on 02/05/2019, demonstrates a large epidural abscess ex
tending from T12-L5 which causes multilevel spinal canal stenosis, most severe at L3-4 where there is
 complete occlusion of the canal with clumping of the nerves.  In addition, mass effect from the absc
ess causes multilevel impingement of the exiting nerve roots.  Abscess extends into the right iliopso
as muscle where it is multiseptated, multilobulated.  Smaller collections present adjacent to the lef
t iliopsoas are noted.  Paraspinal abscess extending between L3 and S1.  Reactive marrow edema noted 
in the L4-5 facet joints.  Interosseous hemangiomas at the lumbar spine are noted.  Exiting nerve arlene
ts are compressed bilaterally at L2-3 and L1-2.  Right iliopsoas abscess measures 4.2 x 3.0 cm. 



MRI of the pelvis from 02/05/2019, demonstrates a large 10.5 cm abscess extending into the left sacru
m, S1-S2 to the level of the left lesser trochanter of the femur.  Reactive muscular edema around the
 hips as well as in the iliopsoas.  Uterine fibroids are noted.  Colonic diverticulosis is noted with
 mild hyperemia around the distal colon, favored to be likely reactive per the radiology report.



IMPRESSION:  This is a 58-year-old female who has had progressively worsening back pain as well as bi
lateral hip pain and bilateral lower extremity leg weakness with no bowel movement for the past 2 wee
ks.  She presented with an extremely high white count as well as hyponatremia and multiple other abno
rmal lab results.  The patient has been started on IV nafcillin by ID. 



All above issues were discussed with Dr. Tillman today over the phone who [facility].  Additionally, I spoke to Dr. Cristal Heard regarding neurosurgical plan for this 
patient, which will include an L3-4 laminectomy today at 1 o'clock with Dr. Tillman.  Furthermore, I cintron
ve spoken with Dr. Damon regarding the patient's sacral abscesses for his treatment recommendatio
ns. 



Given the patient's abnormal Hart's reflex on the left side with slight left upper extremity weakn
ess, we recommend she undergo a cervical MRI and thoracic MRI with and without contrast prior to surg
joao for her lumbar spine to evaluate for any disease or abscess in these areas as well.  Currently, t
he patient is n.p.o. and agrees to proceed with surgery.





Job #:  [id number]

## 2019-02-06 NOTE — POSTOPPROG
Post Op Note


Date of Operation: 02/06/19


Surgeon: Vincent Tillman


Assistant: Betty Espinoza PA-C (webb-hammond)


Anesthesia: GET(General Endotracheal)


Pre-op Diagnosis: Thoracic and lumbar epidural abscess


Post-op Diagnosis: same


Procedure: T4-5, T9-10, L4-5 Laminectomy for washout of epidural abscess


Inf/Abcess present in the surg proc area at time of surgery?: Yes


Depth: Organ Space


EBL: 


Complications: 





None observed   


Drains: Danny Avitia (DIRK X1 at Lumbar L4 incision, to full suction)





SOAP Progress Note


Assessment/Plan: 


Assessment:


























Plan:





02/06/19 16:40


S: Patient in PACU. Stable, still groggy and waking up with some expected 

incisional pain. 





O:


NAD, VSS


PERRL, EOMI


Still waking up but following commands


VILLANUEVA X4


BLE still grossly weak R > L especially at hip flexor as was preop


Sensation intact to lt touch


Incisions x 3 c/d/i


Dirk X 1- to full suction





A: 57 yo female sp T4-5, T9-10, L4-5 laminectomy for washout of epidural abscess





P:


-Admit back to SDU


-Advance diet as tolerated


-Optimize pain management


-PT/OT


-intraop cultures taken


-Follow ID recs


-DIRK X1- to full suction, keep until output minimal


-Patient seen in PACU by Dr. Tillman as well





Objective: 





 Vital Signs











Temp Pulse Resp BP Pulse Ox


 


 36.7 C   86   23 H  102/50 L  96 


 


 02/06/19 12:39  02/06/19 13:08  02/06/19 13:08  02/06/19 13:08  02/06/19 13:08








 Microbiology











 02/04/19 18:23 Gram Stain - Final





 Knee - Aspirate Body Fluid Culture - Final


 


 02/04/19 18:25 Blood Panel (PCR) - Final





 Blood    S.aureus Methicillin Suscept.


 


 02/04/19 18:06 Urine Culture - Final





 Urine,Catheterized    Staphylococcus Aureus








 Laboratory Results





 02/06/19 06:12 





 02/06/19 06:12 





 











 02/05/19 02/06/19 02/07/19





 05:59 05:59 05:59


 


Intake Total 2200 3106 


 


Output Total 450 1700 


 


Balance 1750 1406

## 2019-02-06 NOTE — ASMTCMCOM
CM Note

 

CM Note                       

Notes:

Spoke with a friend of Torsten diggs who wanted to give us information about patient's 

circumstances. Virginia Khoury states the patient's apartment is overcrowded with stuff and she 

does not think she can safely return there to convalesce. Virginia ([phone]) also states the 

patient has 2 cats and she is currently trying to feed them and clean the cat pan. However, if she 

is going to be going to rehab for a long period of time after hospitalization, she doesn't know if 

she can care for them the whole time she is gone. Virginia did state she will take care of them for 


now. She reports the cats are very important to the patient as she does not have children or a lot 

of family. Virginia states patient has a sister in [place], and a brother who is close by but 

she is not sure where. CM will follow.

 

Date Signed:  02/06/2019 03:32 PM

Electronically Signed By:Martha Sanders LCSW

## 2019-02-06 NOTE — PDANEPAE
ANE History of Present Illness





here for L3-4 T3-4 and T9-10 lami and I and D





ANE Past Medical History





- Cardiovascular History


Hx Hypertension: Yes


Hx Arrhythmias: No


Hx Chest Pain: No


Hx Coronary Artery / Peripheral Vascular Disease: No


Hx CHF / Valvular Disease: No


Hx Palpitations: No





- Pulmonary History


Hx COPD: No


Hx Asthma/Reactive Airway Disease: No


Hx Recent Upper Respiratory Infection: No


Hx Oxygen in Use at Home: No


Hx Sleep Apnea: No


Sleep Apnea Screening Result - Last Documented: Negative





- Endocrine History


Hx Diabetes: No


Hypothyroid: No





- Chronic Pain History


Chronic Pain: No





ANE Review of Systems


Review of systems is: negative


Review of Systems: 








- Exercise capacity


Exercise capacity: >=4 METS





ANE Patient History





- Allergies


Allergies/Adverse Reactions: 








No Known Allergies Allergy (Unverified 02/04/19 15:35)


 








- Home Medications


Home medications: home medication list seen and reviewed


Home Medications: 








Hydrochlorothiazide [HCTZ (*)] 25 mg PO DAILY 01/24/19 [Last Taken Unknown]


Lisinopril [Zestril 10 mg (*)] 10 mg PO DAILY 02/04/19 [Last Taken 02/02/19]








- NPO status


NPO Status: no food or drink >8 hours


NPO Since - Liquids (Date): 02/06/19


NPO Since - Liquids (Time): 00:01


NPO Since - Solids (Date): 02/04/19


NPO Since - Solids (Time): 00:01





- Smoking Hx


Smoking Status: Never smoked





- Alcohol Use


Alcohol Use: Occasionally





ANE Labs/Vital Signs





- Labs


Result Diagrams: 


 02/06/19 06:12





 02/06/19 06:12





- Vital Signs


Vital Signs: reviewed preoperatively; see RN documention for details


Blood Pressure: 102/50


Heart Rate: 86


Respiratory Rate: 23


O2 Sat (%): 96


Height: 160.02 cm


Weight: 79.8 kg





ANE Physical Exam





- Airway


Neck exam: FROM


Mallampati Score: Class 1


Mouth exam: normal dental/mouth exam





- Pulmonary


Pulmonary: no respiratory distress





- Cardiovascular


Cardiovascular: regular rate and rhythym





- ASA Status


ASA Status: IV





ANE Anesthesia Plan


Anesthesia Plan: general endotracheal anesthesia


Lines/Monitors: central line

## 2019-02-06 NOTE — HOSPPROG
Hospitalist Progress Note


Assessment/Plan: 





DIAGNOSES: 


* Acute sepsis with organ failure 


* MSSA bacteremia


* Bilateral psoas abscesses


* Large epidural abscess, and paraspinous abscesses


* Suspect endocarditis


* Acute polyarticular inflammatory arthropathy with effusion in knee, concern 

for septic arthritis in any of these joints


* Acute kidney injury


* Hyponatremia, hypovolemic with low urine sodium


   -slowly improving on current therapy


* Hypokalemia


* Pyuria of undetermined significance with no symptoms


* Generalized weakness and deconditioning due to above


* Chronic back pain





At this point a strongly suspect that her whole presenting picture is a staph 

infection.  As she is afebrile despite multiple signs of significant illness, 

would be suspicious for lumbar spine infection, endocarditis, or other similar 

process that can high fever.





PLANS:


* IV nafcillin, will need at least probably 6 weeks antibiotics


* Await for final culture results from other sources; will need follow-up 

surveillance blood cultures for clearance


* Follow sodium closely with goal of no more than 10 point per 24 hr increase; 

continue IV saline and oral sodium supplements


* She will go to OR for drainage of epidural abscess today with Neurosurgery


* I reviewed with Dr. Choco Damon his recommending attempt at percutaneous 

drainage of her psoas abscesses which seems appropriate to me


* Increase activity as able


* Continue follow electrolytes closely


* DVT prophylaxis





Seen by me today on hospitalist rounds as well as ICU multi discipline rounds


Reviewed in detail with Dr. Choco Damon





SUBJECTIVE:


Remains having ongoing lumbar pain and pain in both legs


Does not mention elbow or wrist or shoulder discomfort this morning


No shortness of breath


No nausea


No chills





OBJECTIVE


Vitals reviewed:  Remains hypotensive, no fever so far; there was some 

tachypnea during the night which has resolved, the cause for this is unclear


Cardiac Monitor, my review:  Sinus





Exam:


alert oriented 


skin warm dry color ok


resps not labored


lungs clear BSs


heart regular no murmur heard


abd soft nondistended nontender, bowel sounds present


limbs warm, no edema; I do not see any splinters or other signs of embolic 

events


Decreased range of motion of left knee passive or active due to pain though 

interestingly the knee is not red or warm and there is really little effusion 

present at this time





iv site ok





Lab data:


Synovial fluid from left knee yesterday had 7500 white cells, neutrophil 

predominant (as best I can tell this fluid has not been cultured)


White count down to 33,000 but remains quite elevated


Hemoglobin a bit lower at 10,000





Microbiology:


Cultures of blood:  Gram-positive cocci in clusters growing from anaerobic 

bottles of both sets


I do not see evidence of synovial fluid cultures


Objective: 


 Vital Signs











Temp Pulse Resp BP Pulse Ox


 


 36.4 C   87   21 H  103/61   98 


 


 02/06/19 17:05  02/06/19 17:05  02/06/19 17:20  02/06/19 17:16  02/06/19 17:20








 Microbiology











 02/04/19 18:23 Gram Stain - Final





 Knee - Aspirate Body Fluid Culture - Final


 


 02/04/19 18:25 Blood Panel (PCR) - Final





 Blood    S.aureus Methicillin Suscept.


 


 02/04/19 18:06 Urine Culture - Final





 Urine,Catheterized    Staphylococcus Aureus








 Laboratory Results





 02/06/19 06:12 





 02/06/19 06:12 





 











 02/05/19 02/06/19 02/07/19





 06:59 06:59 06:59


 


Intake Total 2200 3106 1420


 


Output Total 450 1700 560


 


Balance 1750 1406 860














ICD10 Worksheet


Patient Problems: 


 Problems











Problem Status Onset


 


Effusion, left knee Acute  


 


Hypokalemia Acute  


 


Hyponatremia Acute  


 


Leukocytosis Acute  


 


UTI (urinary tract infection) Acute  


 


Weakness Acute

## 2019-02-06 NOTE — HOSPPROG
Hospitalist Progress Note


Assessment/Plan: 





DIAGNOSES: 


* Acute sepsis with organ failure 


* MSSA bacteremia


* Bilateral psoas abscesses


* Large epidural abscess, and paraspinous abscesses


* Suspect endocarditis


* Acute polyarticular inflammatory arthropathy with effusion in knee, concern 

for septic arthritis in any of these joints


* Acute kidney injury


* Hyponatremia, hypovolemic with low urine sodium


   -slowly improving on current therapy


* Hypokalemia


* Pyuria of undetermined significance with no symptoms


* Generalized weakness and deconditioning due to above


* Chronic back pain





At this point a strongly suspect that her whole presenting picture is a staph 

infection.  As she is afebrile despite multiple signs of significant illness, 

would be suspicious for lumbar spine infection, endocarditis, or other similar 

process that can high fever.





PLANS:


* IV nafcillin, will need at least probably 6 weeks antibiotics


* Await for final culture results from other sources; will need follow-up 

surveillance blood cultures for clearance


* Follow sodium closely with goal of no more than 10 point per 24 hr increase; 

continue IV saline and oral sodium supplements


* She will go to OR for drainage of epidural abscess today with Neurosurgery


* I reviewed with Dr. Choco Damon he is recommending attempt at percutaneous 

drainage of her psoas abscesses which seems appropriate to me


* Increase activity as able


* Continue follow electrolytes closely


* DVT prophylaxis





Seen by me today on hospitalist rounds as well as ICU multi discipline rounds


Reviewed in detail with Dr. Choco Damon





SUBJECTIVE:


Remains having ongoing lumbar pain and pain in both legs


Does not mention elbow or wrist or shoulder discomfort this morning


No shortness of breath


No nausea


No chills





OBJECTIVE


Vitals reviewed: overall remains stable without fever


Cardiac Monitor, my review:  Sinus





Exam:


alert mildly disoriented preop; looks very tired


skin warm dry color ok


resps not labored


lungs clear BSs


heart regular no murmur heard


abd soft nondistended nontender, bowel sounds present


limbs warm, no edema; I do not see any splinters or other signs of embolic 

events


Decreased range of motion of left knee passive or active due to pain though 

interestingly the knee is not red or warm and there is really little effusion 

present at this time





iv site ok





Lab data:


Synovial fluid from left knee yesterday had 7500 white cells, neutrophil 

predominant (as best I can tell this fluid has not been cultured)


White count down to 33,000 but remains quite elevated


Hemoglobin a bit lower at 10,000





Microbiology:


Cultures of blood:  Gram-positive cocci in clusters growing from anaerobic 

bottles of both sets


I do not see evidence of synovial fluid cultures


Objective: 


 Vital Signs











Temp Pulse Resp BP Pulse Ox


 


 36.7 C   86   23 H  102/50 L  96 


 


 02/06/19 08:00  02/06/19 08:00  02/06/19 08:00  02/06/19 08:00  02/06/19 08:00








 Microbiology











 02/04/19 18:23 Gram Stain - Final





 Knee - Aspirate Body Fluid Culture - Final


 


 02/04/19 18:25 Blood Panel (PCR) - Final





 Blood    S.aureus Methicillin Suscept.


 


 02/04/19 18:06 Urine Culture - Final





 Urine,Catheterized    Staphylococcus Aureus








 Laboratory Results





 02/06/19 06:12 





 02/06/19 06:12 





 











 02/05/19 02/06/19 02/07/19





 06:59 06:59 06:59


 


Intake Total 2200 3106 


 


Output Total 450 1700 


 


Balance 1750 1406 














- Time Spent With Patient


Time Spent with Patient: greater than 35 minutes


Time Spent with Patient: Greater than 35 minutes spent on this patients care, 

greater than 50% of time spent counseling, educating, and coordinating care 

regarding the above mentioned plan.





ICD10 Worksheet


Patient Problems: 


 Problems











Problem Status Onset


 


Effusion, left knee Acute  


 


Hypokalemia Acute  


 


Hyponatremia Acute  


 


Leukocytosis Acute  


 


UTI (urinary tract infection) Acute  


 


Weakness Acute

## 2019-02-06 NOTE — GHP
[f rep st]



                                                            HISTORY AND PHYSICAL





DATE OF ADMISSION:  02/04/2019



I have been asked to see the patient by the neurosurgical service in regard to recently diagnosed pel
lon abscesses. 



This 58-year-old female presents the day before yesterday with a confusing picture of diffuse body pa
in, leukocytosis, hypotension, severe electrolyte abnormalities.  She states that she has several wee
ks of lethargy, staying in bed almost all the time, only getting up to drink water or orange juice, l
iving on crackers.  While she had been seen in the [facility] Emergency Room several weeks berenice
or, it is unclear why she did not return considering her symptoms were debilitating.  Upon this prese
ntation, she was noted to have a white blood count of 39,000, hyponatremia, hypokalemia, hypochloremi
a, elevated creatinine.  Blood cultures have become positive for MSSA.  A knee effusion has been aspi
rated and most recently an MRI of the lumbar spine has been done because of back pain which shows an 
extensive epidural abscess from T12 down to L2.  Also noted is a large fibroid in the pelvis as well 
as a small right psoas abscess and a larger left psoas abscess.



PAST MEDICAL HISTORY:  Fairly unremarkable.



MEDICATIONS:  The patient takes lisinopril and hydrochlorothiazide.



ALLERGIES:  Denies.



PREVIOUS SURGERIES:  Denies.



SOCIAL HISTORY:  Works as a caregiver.



PHYSICAL EXAM:  HEART:  No obvious heart murmur.  LUNGS:  Clear.  GENERAL:  She seems comfortable.  S
tates she is not in much pain, but when the legs are elevated, she screams and yells.  She states she
 has not been able to walk or go up stairs because of the pain for weeks.  ABDOMEN:  Soft, benign.  F
lexion of the legs produces severe pain as does extension of the leg for psoas sign. 



MRI is reviewed with the radiologist, showing the above-mentioned significant findings.



ASSESSMENT:  The patient with multiple septic foci, including the epidural space, intraabdominal jo-ann
ections adjacent to the psoas, left larger than right. 



I specifically have been asked to see the patient in regard to the psoas abscesses.  The right is onl
y a few centimeters in size, somewhat septated, and while this could be operatively drained, it is ac
tually difficult to locate such small collections and often they are unrewarding in terms of yielding
 a lot of purulence. 



The left side is larger.  It will still require quite a large operation given the patient's abdominal
 size to get to and drain, and I have discussed the case with the interventionalist in regard to perc
utaneous drainage.  He feels the left side would be amenable to catheter drainage.  The right side is
 smaller, but aspiration would be reasonable.  Since the patient is going to be on long-term antibiot
ics for her epidural spinous abscess and other issues, I think it is reasonable to try to see if Inte
rventional Radiology can aspirate the right and drain the left, and with long-term antibiotics, these
 all might resolve.  The patient can be followed with serial scans.  If this does not improve her sit
uation at a later date, we could certainly entertain operative drainage of these collections.  They s
eem to be contiguous with the epidural abscess and might decompress with the drains placed in the epi
dural space. 



In summary, we will try interventional radiologic drainage and consider operative exploration only if
 required.  I explained all this to the patient.  She seemed somewhat tangential in her questions.  I
 am not sure if she fully comprehends the magnitude of her problem.  I also discussed the epidural ab
scess situation with her as she seemed unaware of this.





Job #:  [id number]

## 2019-02-06 NOTE — PDINTPN
Intensivist Progress Note


Assessment/Plan: 


Assessment/plan:


* Acute sepsis


      -antibiotics per Infectious Disease


* Chronic back pain


* Staph bacteremia-echocardiogram reveals no evidence of vegetations


* Epidural abscess T12 through at L5.  Paraspinal abscesses L3 an S1


      -to operating room today


* Multi septated abscess right psoas muscle as well as left psoas muscle


      -surgery has been consulted


* Hypokalemia


* Pyuria


* Obesity


* VT prophylaxis


* Stress ulcer prophylaxis


* Pain-well controlled














Subjective: 





Sitting comfortably in bed.  Pain well controlled.  Denies any breathlessness.


Objective: 





 Vital Signs











Temp Pulse Resp BP Pulse Ox


 


 36.7 C   86   23 H  102/50 L  96 


 


 02/06/19 08:00  02/06/19 08:00  02/06/19 08:00  02/06/19 08:00  02/06/19 08:00








 Microbiology











 02/04/19 18:23 Gram Stain - Final





 Knee - Aspirate Body Fluid Culture - Final


 


 02/04/19 18:25 Blood Panel (PCR) - Final





 Blood    S.aureus Methicillin Suscept.


 


 02/04/19 18:06 Urine Culture - Final





 Urine,Catheterized    Staphylococcus Aureus








 Laboratory Results





 02/06/19 06:12 





 02/06/19 06:12 





 











 02/05/19 02/06/19 02/07/19





 05:59 05:59 05:59


 


Intake Total 2200 3106 


 


Output Total 450 1700 


 


Balance 1750 1406 











 Laboratory Results





 02/06/19 06:12 





 02/06/19 06:12 





 











  02/06/19 02/04/19 02/04/19





  06:12 18:25 18:25


 


Calcium  6.9 mg/dL L mg/dL    





  (8.5 - 10.4)  


 


Total Bilirubin  3.1 mg/dL H mg/dL    





  (0.1 - 1.4)  


 


Conjugated Bilirubin  2.6 mg/dL H mg/dL    





  (0.0 - 0.5)  


 


Unconjugated Bilirubin  0.5 mg/dL mg/dL    





  (0.0 - 1.1)  


 


AST  35 IU/L IU/L    





  (14 - 46)  


 


ALT  48 IU/L IU/L    





  (9 - 52)  


 


Alkaline Phosphatase  171 IU/L H IU/L    





  (38 - 126)  


 


Total Protein  5.6 g/dL L g/dL    





  (6.3 - 8.2)  


 


Albumin  2.1 g/dL L g/dL    





  (3.5 - 5.0)  


 


Rheum Factor Semi-Quant      





    


 


Anti-Cycl Citrul Peptide    < 15.6 U U  





    


 


ANTONELLA Screen      Pending 





    














  02/04/19





  18:25


 


Calcium  





  


 


Total Bilirubin  





  


 


Conjugated Bilirubin  





  


 


Unconjugated Bilirubin  





  


 


AST  





  


 


ALT  





  


 


Alkaline Phosphatase  





  


 


Total Protein  





  


 


Albumin  





  


 


Rheum Factor Semi-Quant  11.5 IU/L IU/L





  (0.0 - 11.9)


 


Anti-Cycl Citrul Peptide  





  


 


ANTONELLA Screen  





  








 











 02/04/19 18:25 Respiratory Panel (PCR) - Final





 Nasal, Sinus - [place] Viral Transport      No Organism Detected By Pcr


 


 02/04/19 18:25 Blood Culture - Preliminary





 Blood      Staphylococcus Aureus


 


 02/04/19 18:25 Blood Culture - Preliminary





 Blood Blood Panel (PCR) - Final





      Staphylococcus Aureus





      S.aureus Methicillin Suscept.


 


 02/04/19 18:23 Gram Stain - Final





 Knee - Aspirate Anaerobic Culture - Preliminary


 


 02/04/19 18:06 Urine Culture - Final





 Urine,Catheterized      Staphylococcus Aureus











- Time Spent With Patient


Time Spent With Patient: 





35 min of time spent with patient, over 1/2 involved with coordination of care 

or counseling.


Case discussed with nursing and hospitalist





Physical Exam





- Physical Exam


General Appearance: alert, no apparent distress, obese


EENT: PERRL/EOMI


Neck: non-tender, full range of motion


Respiratory: chest non-tender, lungs clear, normal breath sounds


Cardiac/Chest: normal peripheral pulses


Peripheral Pulses: 2+: carotid (R), carotid (L), femoral (R), femoral (L), 

dorsalis-pedis (R), dorsalis-pedis (L)


Abdomen: normal bowel sounds, non-tender, soft


Pelvic Exam: deferred


Rectal: deferred


Skin: normal color, warm/dry


Extremities: normal range of motion, non-tender, normal inspection, normal 

capillary refill


Neuro/Psych: alert, normal mood/affect, oriented x 3





ICD10 Worksheet


Patient Problems: 


 Problems











Problem Status Onset


 


Effusion, left knee Acute  


 


Hypokalemia Acute  


 


Hyponatremia Acute  


 


Leukocytosis Acute  


 


UTI (urinary tract infection) Acute  


 


Weakness Acute

## 2019-02-07 LAB
INR PPP: 1.32 (ref 0.83–1.16)
PLATELET # BLD: 372 10^3/UL (ref 150–400)
PROTHROMBIN TIME: 16.6 SEC (ref 12–15)

## 2019-02-07 PROCEDURE — 0W9H30Z DRAINAGE OF RETROPERITONEUM WITH DRAINAGE DEVICE, PERCUTANEOUS APPROACH: ICD-10-PCS | Performed by: RADIOLOGY

## 2019-02-07 PROCEDURE — 0H98X0Z DRAINAGE OF BUTTOCK SKIN WITH DRAINAGE DEVICE, EXTERNAL APPROACH: ICD-10-PCS | Performed by: RADIOLOGY

## 2019-02-07 RX ADMIN — NAFCILLIN SCH MLS: 2 INJECTION, POWDER, FOR SOLUTION INTRAMUSCULAR; INTRAVENOUS at 02:35

## 2019-02-07 RX ADMIN — DOCUSATE SODIUM AND SENNOSIDES SCH TAB: 50; 8.6 TABLET ORAL at 21:31

## 2019-02-07 RX ADMIN — NAFCILLIN SCH MLS: 2 INJECTION, POWDER, FOR SOLUTION INTRAMUSCULAR; INTRAVENOUS at 14:00

## 2019-02-07 RX ADMIN — SODIUM CHLORIDE SCH MLS: 900 INJECTION, SOLUTION INTRAVENOUS at 14:00

## 2019-02-07 RX ADMIN — HYDROMORPHONE HYDROCHLORIDE PRN MG: 1 INJECTION, SOLUTION INTRAMUSCULAR; INTRAVENOUS; SUBCUTANEOUS at 02:35

## 2019-02-07 RX ADMIN — HYDROMORPHONE HYDROCHLORIDE PRN MG: 1 INJECTION, SOLUTION INTRAMUSCULAR; INTRAVENOUS; SUBCUTANEOUS at 07:52

## 2019-02-07 RX ADMIN — NAFCILLIN SCH MLS: 2 INJECTION, POWDER, FOR SOLUTION INTRAMUSCULAR; INTRAVENOUS at 17:59

## 2019-02-07 RX ADMIN — METHOCARBAMOL PRN MG: 750 TABLET ORAL at 21:31

## 2019-02-07 RX ADMIN — NAFCILLIN SCH MLS: 2 INJECTION, POWDER, FOR SOLUTION INTRAMUSCULAR; INTRAVENOUS at 05:20

## 2019-02-07 RX ADMIN — Medication PRN MCG: at 13:14

## 2019-02-07 RX ADMIN — NAFCILLIN SCH MLS: 2 INJECTION, POWDER, FOR SOLUTION INTRAMUSCULAR; INTRAVENOUS at 21:32

## 2019-02-07 RX ADMIN — NAFCILLIN SCH MLS: 2 INJECTION, POWDER, FOR SOLUTION INTRAMUSCULAR; INTRAVENOUS at 10:25

## 2019-02-07 NOTE — PDRADPN
Radiology Procedure Note


Date of Procedure: 02/07/19


Radiologist: Sidra Garza


Anesthesia: IV Sedation


Pre-op Diagnosis: abscesses


Post-op Diagnosis: same


Indication: extensive epidural abscss


Procedure: LT gluteal and RT psoas abscess drain placement with CT guidance


Finding(s): 6cc out of RT, 27cc out of LT, sent for GS/CX


Inf/Abcess present in the surg proc area at time of surgery?: Yes


Depth: Deep Incisional (Fascial)

## 2019-02-07 NOTE — PDINTPN
Intensivist Progress Note


Assessment/Plan: 


Assessment/plan:


* Acute sepsis


      -antibiotics per Infectious Disease


* Chronic back pain


* Staph bacteremia-echocardiogram reveals no evidence of vegetations


* Epidural abscess T12 through at L5.  Paraspinal abscesses L3 an S1.  Status 

post T4-5, T9-10, L4-5 laminectomy with washout





* Multi septated abscess right psoas muscle as well as left psoas muscle


      -to IR today for drainage


* Hypokalemia


* Pyuria


* Obesity


* VT prophylaxis


* Stress ulcer prophylaxis


* Pain-well controlled











Subjective: 





Resting comfortably in bed.  Pain is reasonably well tolerated.


Objective: 





 Vital Signs











Temp Pulse Resp BP Pulse Ox


 


 36.9 C   86   27 H  99/54 L  100 


 


 02/07/19 07:38  02/07/19 07:38  02/07/19 07:38  02/07/19 07:38  02/07/19 07:38








 Microbiology











 02/04/19 18:25 Blood Panel (PCR) - Final





 Blood    S.aureus Methicillin Suscept.


 


 02/06/19 14:46 Gram Stain - Final





 Back - Eswab 


 


 02/06/19 14:46 Gram Stain - Final





 Chest - Eswab 


 


 02/04/19 18:23 Gram Stain - Final





 Knee - Aspirate Body Fluid Culture - Final


 


 02/04/19 18:06 Urine Culture - Final





 Urine,Catheterized    Staphylococcus Aureus








 Laboratory Results





 02/07/19 05:20 





 02/07/19 05:20 





 











 02/06/19 02/07/19 02/08/19





 05:59 05:59 05:59


 


Intake Total 3106 4683 


 


Output Total 1700 2673 


 


Balance 1406 2010 








 











PT  16.6 SEC (12.0-15.0)  H  02/07/19  05:20    


 


INR  1.32  (0.83-1.16)  H  02/07/19  05:20    











 Laboratory Results





 02/07/19 05:20 





 02/07/19 05:20 





 











  02/07/19 02/07/19





  05:20 05:20


 


PT  16.6 SEC H SEC  





  (12.0 - 15.0) 


 


INR  1.32  H   





  (0.83 - 1.16) 


 


APTT  32.9 SEC SEC  





  (23.0 - 38.0) 


 


Calcium    6.6 mg/dL L mg/dL





   (8.5 - 10.4)


 


Total Bilirubin    4.0 mg/dL H mg/dL





   (0.1 - 1.4)


 


Conjugated Bilirubin    3.6 mg/dL H mg/dL





   (0.0 - 0.5)


 


Unconjugated Bilirubin    0.4 mg/dL mg/dL





   (0.0 - 1.1)


 


AST    33 IU/L IU/L





   (14 - 46)


 


ALT    46 IU/L IU/L





   (9 - 52)


 


Alkaline Phosphatase    144 IU/L H IU/L





   (38 - 126)


 


Total Protein    4.9 g/dL L g/dL





   (6.3 - 8.2)


 


Albumin    1.9 g/dL L g/dL





   (3.5 - 5.0)








 











 02/04/19 18:25 Blood Culture - Preliminary





 Blood Blood Panel (PCR) - Final





      Staphylococcus Aureus





      S.aureus Methicillin Suscept.











- Time Spent With Patient


Time Spent With Patient: 





35 min of time spent with patient, over 1/2 involved coordination of care 

counseling.


Case discussed with nursing





Physical Exam





- Physical Exam


General Appearance: alert, no apparent distress, obese


EENT: PERRL/EOMI


Neck: non-tender, supple


Respiratory: chest non-tender, lungs clear, normal breath sounds


Cardiac/Chest: normal peripheral pulses, regular rate, rhythm


Abdomen: normal bowel sounds, non-tender, soft


Pelvic Exam: deferred


Rectal: deferred


Skin: normal color, warm/dry


Extremities: normal range of motion, non-tender, normal inspection, normal 

capillary refill


Neuro/Psych: no motor/sensory deficits, alert, normal mood/affect, oriented x 3





ICD10 Worksheet


Patient Problems: 


 Problems











Problem Status Onset


 


Effusion, left knee Acute  


 


Hypokalemia Acute  


 


Hyponatremia Acute  


 


Leukocytosis Acute  


 


UTI (urinary tract infection) Acute  


 


Weakness Acute

## 2019-02-07 NOTE — NEUSURGPN
Date of Surgery: 02/06/19


Post Op Day: 1


Assessment/Plan: 


Assessment: 57 yo female sp T4-5, T9-10, L4-5 laminectomy for washout of 

epidural abscess





Plan:


-Patient is NPO for IR procedure today, ok to change Robaxin to IV 


-Optimize pain management


-PT/OT


-intraop cultures taken, pending 


-Follow ID recs


-PASQUALE X1- to full suction, minimal output but will leave in for now  


-Patient discussed with Dr Tillman 


Please call with any questions/concerns 





Subjective: 


Left leg better since surgery, continued right leg pain 


Objective: 





PERRL, EOMI


VILLANUEVA X4


RLE still grossly weak, LLE 4-5/5 


Sensation intact to lt touch


Incisions x 3 c/d/i


Pasquale X 1- to full suction


Neuro Check Frequency: per routine 


Urinary Catheter in Place: No


Urinary Catheter Indication: Accurate I & O Required





- Physician


Discussed Patient with : Primo





Neurosurgery Physical Exam





- Vitals, I&O, Labs





 I and O











 02/06/19 02/07/19 02/08/19





 05:59 05:59 05:59


 


Intake Total 3106 4683 


 


Output Total 1700 2673 


 


Balance 1406 2010 


 


Weight  79.8 kg 


 


Intake:   


 


  Oral (ml) 500 820 


 


  IV Intake (ml)  2600 


 


  IV Infused (ml) 2606 1263 


 


    Nafcillin Sodium 2 gm In 200 500 





    D5w 100 ml @ 100 mls/hr   





    IV Q4HRS [facility] Rx#:   





    [id number]   


 


    Ns 1,000 ml @ 100 mls/hr 506 763 





    IV CONT [facility] Rx#:   





    [id number]   


 


    Ns 1,000 ml @ 3000 mls/hr 1000  





    IV ONCE ONE Rx#:   





    [id number]   


 


    Ns 500 ml @ 1500 mls/hr 500  





    IV ONCE ONE Rx#:   





    [id number]   


 


    Vancomycin 1.25 gm In D5w 250  





    250 ml @ 166.67 mls/hr   





    IV Q12H ONE Rx#:   





    [id number]   


 


    ceFAZolin 2 GM/DEXTROSE 100  





    100 ml @ 200 mls/hr IV   





    Q8HRS [facility] Rx#:[id number]   


 


    cefTRIAXone 1 GM/DEXTROSE 50  





    50 ml @ 100 mls/hr IV   





    DAILY [facility] Rx#:[id number]   


 


Output:   


 


  Urine (ml) 1700 2600 


 


    Catheter 1000 2100 


 


    Incontinence 700 500 


 


  Estimated Blood Loss (ml)  50 


 


  Emesis (ml)  0 


 


  PASQUALE Drain Output (ml)  23 


 


    Back  23 


 


Other:   


 


  Intake Quantity Yes Yes 





  Sufficient   


 


  Output Comment   


 


    Catheter pt is going to try really hard to urinate  


 


    Incontinence external catheter  


 


  Number of Voids   


 


    Catheter  1 


 


  Bladder Scan Volume (ml)   


 


    Catheter 599  








 Microbiology











 02/04/19 18:25 Blood Panel (PCR) - Final





 Blood    S.aureus Methicillin Suscept.


 


 02/06/19 14:46 Gram Stain - Final





 Back - Eswab 


 


 02/06/19 14:46 Gram Stain - Final





 Chest - Eswab 


 


 02/04/19 18:23 Gram Stain - Final





 Knee - Aspirate Body Fluid Culture - Final


 


 02/04/19 18:06 Urine Culture - Final





 Urine,Catheterized    Staphylococcus Aureus








 Vital Signs











Temp Pulse Resp BP Pulse Ox


 


 36.9 C   86   27 H  99/54 L  100 


 


 02/07/19 07:38  02/07/19 07:38  02/07/19 07:38  02/07/19 07:38  02/07/19 07:38








 Laboratory Results





 02/07/19 05:20 





 02/07/19 05:20 











ICD10 Worksheet


Patient Problems: 


 Problems











Problem Status Onset


 


Effusion, left knee Acute  


 


Hypokalemia Acute  


 


Hyponatremia Acute  


 


Leukocytosis Acute  


 


UTI (urinary tract infection) Acute  


 


Weakness Acute

## 2019-02-07 NOTE — PCMIDPN
Assessment/Plan: 





# Multifocal epidural abscess s/p T4-5, T9-10, L4-5 Laminectomy for washout of 

epidural abscess, B iliopsoas abscess and paraspinal abscess L3 to S1. Large 10 

cm abscess extending from L sacrum to the level of lesser trochanter.  Also 

with L knee, L hand, R wrist, R 2nd toe inflammation  - all better today.  ECHO 

reviewed not significant valve dysfunction


--Continue IV Nafcillin


--drainage of sacral, iliopsoas abscess by IR today, suspect will eventually 

need surgical drainage


--monitor output of new drains, will likely need repeat drainage of iliopsoas , 

sacral abscess


--not clear to me patient understand seriousness of situation


--at this point, SALAS would not 


--repeat blood cx in AM


--once blood cx cleared could move central line to PICC


--suspect extent of infection related to duration of disease, but will check 

immunoglobulins 





meds


Nafcillin 2gm IV q4h, #2





Micro


02/04 Blood cx (2) MSSA


02/04 UCx : MSSA


02/06 OR cx from epidural space gram stains all + for GPCs








Subjective: 





patient stated back pain is different than preoperatively


Objective: 


 Vital Signs











Temp Pulse Resp BP Pulse Ox


 


 36.9 C   86   27 H  99/54 L  100 


 


 02/07/19 07:38  02/07/19 07:38  02/07/19 07:38  02/07/19 07:38  02/07/19 07:38








 Microbiology











 02/04/19 18:23 Gram Stain - Final





 Knee - Aspirate Body Fluid Culture - Final


 


 02/04/19 18:25 Blood Culture - Final





 Blood    Staphylococcus Aureus


 


 02/04/19 18:25 Blood Culture - Final





 Blood    Staphylococcus Aureus





 Blood Panel (PCR) - Final





    S.aureus Methicillin Suscept.


 


 02/06/19 14:46 Gram Stain - Final





 Back - Eswab 


 


 02/06/19 14:46 Gram Stain - Final





 Chest - Eswab 


 


 02/04/19 18:06 Urine Culture - Final





 Urine,Catheterized    Staphylococcus Aureus








 Laboratory Results





 02/07/19 05:20 





 02/07/19 05:20 





 











 02/06/19 02/07/19 02/08/19





 05:59 05:59 05:59


 


Intake Total 3106 4683 


 


Output Total 1700 2673 


 


Balance 1406 2010 








 











ESR  72 MM/HR (0-30)  H  02/05/19  04:58    


 


C-Reactive Protein  204.9 mg/L (<10.0)  H  02/05/19  04:58    














- Physical Exam


General Appearance: alert, no apparent distress


EENT: pale conjunctiva, poor dentition, No thrush


Respiratory: lungs clear, No accessory muscle use


Neck: supple


Cardiac/Chest: regular rate, rhythm, No systolic murmur


Extremities: pedal edema


Abdomen: non-tender, soft


Pelvic Exam: other (external rincon)


Back: other (dressing in place (3 spots) PASQUALE with serosang fluid)


Skin: pallor, other (Inflammation L dorsum hand, R lateral wrist, mild swelling 

and tenderness L knee; possible spinter hemorrhage R 2nd toe)


Neuro/Psych: alert, normal mood/affect, oriented x 3, other (improved strength 

LE , able to briefly lift both legs off bed.  no patellar reflexes)





- Line/s


  ** other


Lines: other (R IJ TLC c/d/i), No drainage, No erythema





- Time Spent With Patient


Time Spent with Patient: greater than 35 minutes (care coordinated w neurosurg, 

hospitalist and radiology)


Time Spent with Patient: Greater than 35 minutes spent on this patients care, 

greater than 50% of time spent counseling, educating, and coordinating care 

regarding the above mentioned plan.





ICD10 Worksheet


Patient Problems: 


 Problems











Problem Status Onset


 


Effusion, left knee Acute  


 


Hypokalemia Acute  


 


Hyponatremia Acute  


 


Leukocytosis Acute  


 


UTI (urinary tract infection) Acute  


 


Weakness Acute

## 2019-02-07 NOTE — HOSPPROG
Hospitalist Progress Note


Assessment/Plan: 





DIAGNOSES: 


* Acute sepsis with organ failure 


* MSSA bacteremia


* Bilateral psoas abscesses


   -to go to IR today for percutaneous drainage


* Large epidural abscess, and paraspinous abscesses


   -extensive surgical incision drainage and exploration of back 2/6


* Suspicion for endocarditis


* Acute polyarticular inflammatory arthropathy with effusion in knee, concern 

for septic arthritis in any of these joints


* Acute kidney injury


   -improving nicely with treatment here so far


* Hyponatremia, hypovolemic with low urine sodium


   -slowly improving on current therapy but remains low sodium


* Acute metabolic encephalopathy due to all of above


* Hypokalemia


* Pyuria of undetermined significance with no symptoms


* Generalized weakness and deconditioning due to above


* Chronic back pain





At this point a strongly suspect that her whole presenting picture is a staph 

infection.  As she is afebrile despite multiple signs of significant illness, 

would be suspicious for lumbar spine infection, endocarditis, or other similar 

process that can high fever.





PLANS:


* IV nafcillin, will need at least probably 6 weeks antibiotics


* Await for final culture results from other sources; will need follow-up 

surveillance blood cultures for clearance


* Interventional Radiology for drainage of psoas abscesses today


* Follow sodium closely with goal of no more than 10 point per 24 hr increase; 

continue IV saline and oral sodium supplements


* Increase activity as able with PT and OT


* Continue follow electrolytes closely


* DVT prophylaxis





Seen by me today on hospitalist rounds as well as ICU multi discipline rounds





SUBJECTIVE:


The still quite a bit of pain in her back after surgery yesterday


Still lot of pain in her right leg with any movement


Overall feels better energy, better appetite


Not short of breath


No chills





OBJECTIVE


Vitals reviewed:  Blood pressures remain borderline, some tachypnea, otherwise 

no fever and good pulse


Cardiac Monitor, my review:  Sinus





Exam:


alert mildly better oriented, more alert, looks more comfortable


skin warm dry color ok


resps not labored


lungs clear BSs


heart regular no murmur heard


abd soft nondistended nontender, bowel sounds present


limbs warm, no edema, better range of motion of her left knee no evidence of 

impaired circulation anywhere


iv site ok





Lab data:


White blood cell count still very elevated 27,000


Hemoglobin has decreased to 8.2


Sodium improved at 126


Albumin very low at 1.9





Microbiology:


Blood cultures 2/4:  MSSA


Urine culture 2/4:  MSSA


Cultures from chest and back abscesses all pending





Echocardiogram done 2/6:  Mild pulmonary hypertension otherwise a normal study 

with no signs of vegetation seen


Objective: 


 Vital Signs











Temp Pulse Resp BP Pulse Ox


 


 36.9 C   86   27 H  99/54 L  100 


 


 02/07/19 07:38  02/07/19 07:38  02/07/19 07:38  02/07/19 07:38  02/07/19 07:38








 Microbiology











 02/04/19 18:25 Blood Panel (PCR) - Final





 Blood    S.aureus Methicillin Suscept.


 


 02/06/19 14:46 Gram Stain - Final





 Back - Eswab 


 


 02/06/19 14:46 Gram Stain - Final





 Chest - Eswab 


 


 02/04/19 18:23 Gram Stain - Final





 Knee - Aspirate Body Fluid Culture - Final


 


 02/04/19 18:06 Urine Culture - Final





 Urine,Catheterized    Staphylococcus Aureus








 Laboratory Results





 02/07/19 05:20 





 02/07/19 05:20 





 











 02/06/19 02/07/19 02/08/19





 06:59 06:59 06:59


 


Intake Total 3106 4683 


 


Output Total 1700 2673 


 


Balance 1406 2010 








 











PT  16.6 SEC (12.0-15.0)  H  02/07/19  05:20    


 


INR  1.32  (0.83-1.16)  H  02/07/19  05:20    














- Time Spent With Patient


Time Spent with Patient: greater than 35 minutes


Time Spent with Patient: Greater than 35 minutes spent on this patients care, 

greater than 50% of time spent counseling, educating, and coordinating care 

regarding the above mentioned plan.





ICD10 Worksheet


Patient Problems: 


 Problems











Problem Status Onset


 


Effusion, left knee Acute  


 


Hypokalemia Acute  


 


Hyponatremia Acute  


 


Leukocytosis Acute  


 


UTI (urinary tract infection) Acute  


 


Weakness Acute

## 2019-02-07 NOTE — SOAPPROG
SOAP Progress Note


Assessment/Plan: 


Assessment/plan:





59 y/o F admitted with sepsis, epidural abscesses, and bilateral psoas abscess


S/p epidural abscess drainage yesterday


Plan for IR drainage of L psoas abscess today.


Continue iv abx.  Hopefully can avoid surgery.  Will need repeat CT scan at 

some point.


Electrolyte imbalances: appreciate medicine input.





S: Denies pain.  Reports that motor function in LLE is improved, but RLE still 

weak.  No abdominal or pelvic pain.





O: Alert


Afebrile


VSS


WBC trending down


RRR


CTAB, no increased WOB


Abdomen: soft, nontender, nondistended, normoactive BS


Lumbar PASQUALE drain with sanguinous drainage








02/07/19 09:58





Objective: 





 Vital Signs











Temp Pulse Resp BP Pulse Ox


 


 36.9 C   86   27 H  99/54 L  100 


 


 02/07/19 07:38  02/07/19 07:38  02/07/19 07:38  02/07/19 07:38  02/07/19 07:38








 Microbiology











 02/04/19 18:25 Blood Culture - Final





 Blood    Staphylococcus Aureus





 Blood Panel (PCR) - Final





    S.aureus Methicillin Suscept.


 


 02/06/19 14:46 Gram Stain - Final





 Back - Eswab 


 


 02/06/19 14:46 Gram Stain - Final





 Chest - Eswab 


 


 02/04/19 18:23 Gram Stain - Final





 Knee - Aspirate Body Fluid Culture - Final


 


 02/04/19 18:06 Urine Culture - Final





 Urine,Catheterized    Staphylococcus Aureus








 Laboratory Results





 02/07/19 05:20 





 02/07/19 05:20 





 











 02/06/19 02/07/19 02/08/19





 05:59 05:59 05:59


 


Intake Total 3106 4683 


 


Output Total 1700 0913 


 


Balance 1406 2010 








 











PT  16.6 SEC (12.0-15.0)  H  02/07/19  05:20    


 


INR  1.32  (0.83-1.16)  H  02/07/19  05:20    














ICD10 Worksheet


Patient Problems: 


 Problems











Problem Status Onset


 


Effusion, left knee Acute  


 


Hypokalemia Acute  


 


Hyponatremia Acute  


 


Leukocytosis Acute  


 


UTI (urinary tract infection) Acute  


 


Weakness Acute

## 2019-02-08 LAB
HIV TYPE 1 AND 2: NEGATIVE
PLATELET # BLD: 365 10^3/UL (ref 150–400)

## 2019-02-08 RX ADMIN — NAFCILLIN SCH MLS: 2 INJECTION, POWDER, FOR SOLUTION INTRAMUSCULAR; INTRAVENOUS at 22:07

## 2019-02-08 RX ADMIN — DOCUSATE SODIUM AND SENNOSIDES SCH TAB: 50; 8.6 TABLET ORAL at 22:06

## 2019-02-08 RX ADMIN — SODIUM CHLORIDE SCH MLS: 900 INJECTION, SOLUTION INTRAVENOUS at 16:57

## 2019-02-08 RX ADMIN — OXYCODONE HYDROCHLORIDE PRN MG: 15 TABLET ORAL at 19:39

## 2019-02-08 RX ADMIN — NAFCILLIN SCH MLS: 2 INJECTION, POWDER, FOR SOLUTION INTRAMUSCULAR; INTRAVENOUS at 17:33

## 2019-02-08 RX ADMIN — OXYCODONE HYDROCHLORIDE PRN MG: 15 TABLET ORAL at 08:49

## 2019-02-08 RX ADMIN — NAFCILLIN SCH MLS: 2 INJECTION, POWDER, FOR SOLUTION INTRAMUSCULAR; INTRAVENOUS at 09:45

## 2019-02-08 RX ADMIN — NAFCILLIN SCH MLS: 2 INJECTION, POWDER, FOR SOLUTION INTRAMUSCULAR; INTRAVENOUS at 02:26

## 2019-02-08 RX ADMIN — NAFCILLIN SCH MLS: 2 INJECTION, POWDER, FOR SOLUTION INTRAMUSCULAR; INTRAVENOUS at 15:25

## 2019-02-08 RX ADMIN — NAFCILLIN SCH MLS: 2 INJECTION, POWDER, FOR SOLUTION INTRAMUSCULAR; INTRAVENOUS at 06:20

## 2019-02-08 RX ADMIN — SODIUM CHLORIDE SCH MLS: 900 INJECTION, SOLUTION INTRAVENOUS at 04:33

## 2019-02-08 RX ADMIN — DOCUSATE SODIUM AND SENNOSIDES SCH TAB: 50; 8.6 TABLET ORAL at 08:40

## 2019-02-08 NOTE — SOAPPROG
SOAP Progress Note


Assessment/Plan: 


Assessment/plan:





59 y/o F admitted with sepsis, epidural abscesses, and bilateral psoas abscess


S/p epidural abscess drainage yesterday


S/p IR drainage of bilateral psoas abscesses


Continue iv abx.  Hopefully can avoid surgery.  Will need repeat CT scan at 

some point.


Electrolyte imbalances: appreciate medicine input.


Anemia.  Hct down to 21.7.  Consider blood transfusion.





S: Endorses pain in back.  Denies abdominal pain.  Tolerating regular diet.





O: Alert


Afebrile


VSS


WBC trending down


Hct 21.7. 


RRR


CTAB, no increased WOB


Abdomen: soft, nontender, nondistended, normoactive BS


PASQUALE drains with sanguinous drainage











02/08/19 11:51





Objective: 





 Vital Signs











Temp Pulse Resp BP Pulse Ox


 


 36.8 C   88   18   99/57 L  95 


 


 02/08/19 11:33  02/08/19 11:33  02/08/19 11:33  02/08/19 11:33  02/08/19 11:33








 Microbiology











 02/06/19 14:46 Gram Stain - Final





 Back - Eswab 


 


 02/06/19 14:46 Gram Stain - Final





 Chest - Eswab 


 


 02/04/19 18:23 Gram Stain - Final





 Knee - Aspirate Body Fluid Culture - Final


 


 02/07/19 13:05 Gram Stain - Final





 Back - Aspirate 


 


 02/07/19 12:52 Gram Stain - Final





 Pelvis - Aspirate 


 


 02/04/19 18:25 Blood Culture - Final





 Blood    Staphylococcus Aureus


 


 02/04/19 18:25 Blood Culture - Final





 Blood    Staphylococcus Aureus





 Blood Panel (PCR) - Final





    S.aureus Methicillin Suscept.








 Laboratory Results





 02/08/19 04:45 





 02/08/19 04:45 





 











 02/07/19 02/08/19 02/09/19





 05:59 05:59 05:59


 


Intake Total 4683 3938 450


 


Output Total 4753 0505 


 


Balance 2010 1583 450








 











PT  16.6 SEC (12.0-15.0)  H  02/07/19  05:20    


 


INR  1.32  (0.83-1.16)  H  02/07/19  05:20    














ICD10 Worksheet


Patient Problems: 


 Problems











Problem Status Onset


 


Effusion, left knee Acute  


 


Hypokalemia Acute  


 


Hyponatremia Acute  


 


Leukocytosis Acute  


 


UTI (urinary tract infection) Acute  


 


Weakness Acute

## 2019-02-08 NOTE — HOSPPROG
Hospitalist Progress Note


Assessment/Plan: 





This patient comes in with diffuse multiple MSSA abscess and bacteremia, very 

weak and poor food intake as she was stuck in bed at home for 2 weeks, s/p 

multiple procedures.  





DIAGNOSES: 


* Acute sepsis with organ failure 


* MSSA bacteremia


* Bilateral psoas abscesses


   -to go to IR today for percutaneous drainage


* Large epidural abscess, and paraspinous abscesses


   -extensive surgical incision drainage and exploration of back 2/6 required 3 

seperate incisions


* Suspicion for endocarditis, no veg on TTE


* Acute polyarticular inflammatory arthropathy with effusion in knee, concern 

for septic arthritis in any of these joints


* Acute kidney injury


   -improving nicely with treatment here so far


* Hyponatremia, hypovolemic with low urine sodium


   -slowly improving on current therapy but remains low sodium


* Acute metabolic encephalopathy due to all of above; resolving nicely


* Hypokalemia


* Pyuria of undetermined significance with no symptoms


* Generalized weakness and deconditioning due to above


* Chronic back pain





At this point a strongly suspect that her whole presenting picture is a staph 

infection.  As she is afebrile despite multiple signs of significant illness, 

would be suspicious for lumbar spine infection, endocarditis, or other similar 

process that can high fever.





PLANS:


* IV nafcillin, will need at least probably 6 weeks antibiotics


* Await for final culture results from other sources; will need follow-up 

surveillance blood cultures (2/8) for clearance


* will need eventual reimaging of both spine and psoas muscles


* will need to change out IJ line for picc once clears bacteremia


* Increase activity as able with PT and OT


* Continue follow electrolytes closely


* DVT prophylaxis





Seen by me today on hospitalist rounds as well as ICU multi discipline rounds





SUBJECTIVE:


still w pain


slept a bit better


eating well


notes being extremely weak w any movement





OBJECTIVE


Vitals reviewed:  Blood pressures remain borderline, some tachypnea, otherwise 

no fever and good pulse


Cardiac Monitor, my review:  Sinus





Exam:


alert mildly better oriented, more alert, looks more comfortable


skin warm dry color ok


resps not labored


lungs clear BSs


heart regular no murmur heard


abd soft nondistended nontender, bowel sounds present


limbs warm, no edema, better range of motion of her left knee no evidence of 

impaired circulation anywhere


iv site ok





Lab data:


Hg down to 7, wbc remains quite high


Na up to 126, rest of chem stable





Microbiology:


Blood cultures 2/4:  MSSA


Urine culture 2/4:  MSSA


Cultures from chest and back abscesses all pending





Echocardiogram done 2/6:  Mild pulmonary hypertension otherwise a normal study 

with no signs of vegetation seen


Objective: 


 Vital Signs











Temp Pulse Resp BP Pulse Ox


 


 37.0 C   92   20   119/66   99 


 


 02/08/19 07:28  02/08/19 07:28  02/08/19 07:28  02/08/19 07:28  02/08/19 07:28








 Microbiology











 02/06/19 14:46 Gram Stain - Final





 Chest - Eswab 


 


 02/06/19 14:46 Gram Stain - Final





 Back - Eswab 


 


 02/07/19 13:05 Gram Stain - Final





 Back - Aspirate 


 


 02/07/19 12:52 Gram Stain - Final





 Pelvis - Aspirate 


 


 02/04/19 18:23 Gram Stain - Final





 Knee - Aspirate Body Fluid Culture - Final


 


 02/04/19 18:25 Blood Culture - Final





 Blood    Staphylococcus Aureus


 


 02/04/19 18:25 Blood Culture - Final





 Blood    Staphylococcus Aureus





 Blood Panel (PCR) - Final





    S.aureus Methicillin Suscept.








 Laboratory Results





 02/08/19 04:45 





 02/08/19 04:45 





 











 02/07/19 02/08/19 02/09/19





 06:59 06:59 06:59


 


Intake Total 4683 3938 450


 


Output Total 8973 3235 


 


Balance 2010 1583 450








 











PT  16.6 SEC (12.0-15.0)  H  02/07/19  05:20    


 


INR  1.32  (0.83-1.16)  H  02/07/19  05:20    














- Time Spent With Patient


Time Spent with Patient: greater than 35 minutes


Time Spent with Patient: Greater than 35 minutes spent on this patients care, 

greater than 50% of time spent counseling, educating, and coordinating care 

regarding the above mentioned plan.





ICD10 Worksheet


Patient Problems: 


 Problems











Problem Status Onset


 


Effusion, left knee Acute  


 


Hypokalemia Acute  


 


Hyponatremia Acute  


 


Leukocytosis Acute  


 


UTI (urinary tract infection) Acute  


 


Weakness Acute

## 2019-02-08 NOTE — PDINTPN
Intensivist Progress Note


Assessment/Plan: 


Assessment/plan:


* Acute sepsis


      -antibiotics per Infectious Disease


* Chronic back pain


* Staph bacteremia-echocardiogram reveals no evidence of vegetations


* Epidural abscess T12 through at L5.  Paraspinal abscesses L3 an S1.  Status 

post T4-5, T9-10, L4-5 laminectomy with washout





* Multi septated abscess right psoas muscle as well as left psoas muscle


      -status post IR drainage


* Hypokalemia


* Pyuria


* Obesity


* VT prophylaxis


* Stress ulcer prophylaxis


* Pain-well controlled


* Disposition-likely stable for transfer to floor











Subjective: 





Sitting up in chair.  Resting comfortably.  Pain well controlled.


Objective: 





 Vital Signs











Temp Pulse Resp BP Pulse Ox


 


 37.0 C   92   20   119/66   99 


 


 02/08/19 07:28  02/08/19 07:28  02/08/19 07:28  02/08/19 07:28  02/08/19 07:28








 Microbiology











 02/04/19 18:23 Gram Stain - Final





 Knee - Aspirate Body Fluid Culture - Final


 


 02/06/19 14:46 Gram Stain - Final





 Chest - Eswab 


 


 02/06/19 14:46 Gram Stain - Final





 Back - Eswab 


 


 02/07/19 13:05 Gram Stain - Final





 Back - Aspirate 


 


 02/07/19 12:52 Gram Stain - Final





 Pelvis - Aspirate 


 


 02/04/19 18:25 Blood Culture - Final





 Blood    Staphylococcus Aureus


 


 02/04/19 18:25 Blood Culture - Final





 Blood    Staphylococcus Aureus





 Blood Panel (PCR) - Final





    S.aureus Methicillin Suscept.








 Laboratory Results





 02/08/19 04:45 





 02/08/19 04:45 





 











 02/07/19 02/08/19 02/09/19





 05:59 05:59 05:59


 


Intake Total 4683 3938 450


 


Output Total 8723 4525 


 


Balance 2010 1583 450








 











PT  16.6 SEC (12.0-15.0)  H  02/07/19  05:20    


 


INR  1.32  (0.83-1.16)  H  02/07/19  05:20    











 Laboratory Results





 02/08/19 04:45 





 02/08/19 04:45 





 











 02/06/19 14:46 Gram Stain - Final





 Chest - Eswab Anaerobic Culture - Preliminary





      Staphylococcus Aureus


 


 02/06/19 14:46 Gram Stain - Final





 Back - Eswab Anaerobic Culture - Preliminary





      Staphylococcus Aureus


 


 02/04/19 18:25 Blood Culture - Final





 Blood      Staphylococcus Aureus


 


 02/04/19 18:25 Blood Culture - Final





 Blood Blood Panel (PCR) - Final





      Staphylococcus Aureus





      S.aureus Methicillin Suscept.











- Time Spent With Patient


Time Spent With Patient: 





35 min of time spent with patient, over 1/2 involved with coordination of care 

counseling.


Case discussed with nursing





Physical Exam





- Physical Exam


General Appearance: alert, no apparent distress


EENT: PERRL/EOMI


Neck: non-tender, supple


Respiratory: chest non-tender, lungs clear, normal breath sounds


Cardiac/Chest: normal peripheral pulses, regular rate, rhythm


Abdomen: normal bowel sounds, non-tender, soft


Pelvic Exam: deferred


Rectal: deferred


Skin: normal color, warm/dry


Extremities: normal range of motion, non-tender, normal inspection, normal 

capillary refill


Neuro/Psych: alert, normal mood/affect, oriented x 3





ICD10 Worksheet


Patient Problems: 


 Problems











Problem Status Onset


 


Effusion, left knee Acute  


 


Hypokalemia Acute  


 


Hyponatremia Acute  


 


Leukocytosis Acute  


 


UTI (urinary tract infection) Acute  


 


Weakness Acute

## 2019-02-08 NOTE — NEUSURGPN
Assessment/Plan: 





Assessment: 59 yo female sp T4-5, T9-10, L4-5 laminectomy for washout of 

epidural abscess POD2





Plan:


-Optimize pain management


-PT/OT


-intraop cultures taken, pending 


-Follow ID recs


-Continue PASQUALE X1


-Patient discussed with Dr Tillman 


-Please call with any questions/concerns 





Subjective: 


low back pain


Objective: 


VILLANUEVA X4


RLE still grossly weak, LLE 4-5/5 


Sensation intact to lt touch


Incisions c/d/i


Pasquale X 1- to full suction





- Physician


Discussed Patient with : Primo





Neurosurgery Physical Exam





- Vitals, I&O, Labs





 I and O











 02/07/19 02/08/19 02/09/19





 05:59 05:59 05:59


 


Intake Total 4683 3938 


 


Output Total 2673 2355 


 


Balance 2010 1583 


 


Weight 79.8 kg  


 


Intake:   


 


  Oral (ml) 820 1250 


 


  IV Intake (ml) 2600 1300 


 


  IV Infused (ml) 1263 1388 


 


    Nafcillin Sodium 2 gm In 500  





    D5w 100 ml @ 100 mls/hr   





    IV Q4HRS ANGELLA Rx#:   





    [id number]   


 


    Ns 1,000 ml @ 100 mls/hr 763 1388 





    IV CONT ANGELLA Rx#:   





    [id number]   


 


Output:   


 


  Urine (ml) 2600 2300 


 


    Catheter 2100 2300 


 


    Incontinence 500  


 


  Estimated Blood Loss (ml) 50  


 


  Emesis (ml) 0  


 


  PASQUALE Drain Output (ml) 23 55 


 


    #1 Posterior Back  30 


 


    #2 Posterior Sacrum  10 


 


    Back 23 15 


 


Other:   


 


  Intake Quantity Yes  





  Sufficient   


 


  Number of Voids   


 


    Catheter 1 1 


 


  Number of Stools   


 


    Catheter  0 








 Microbiology











 02/06/19 14:46 Gram Stain - Final





 Back - Eswab 


 


 02/06/19 14:46 Gram Stain - Final





 Chest - Eswab 


 


 02/07/19 13:05 Gram Stain - Final





 Back - Aspirate 


 


 02/07/19 12:52 Gram Stain - Final





 Pelvis - Aspirate 


 


 02/04/19 18:23 Gram Stain - Final





 Knee - Aspirate Body Fluid Culture - Final


 


 02/04/19 18:25 Blood Culture - Final





 Blood    Staphylococcus Aureus


 


 02/04/19 18:25 Blood Culture - Final





 Blood    Staphylococcus Aureus





 Blood Panel (PCR) - Final





    S.aureus Methicillin Suscept.








 Vital Signs











Temp Pulse Resp BP Pulse Ox


 


 37.0 C   92   20   119/66   99 


 


 02/08/19 07:28  02/08/19 07:28  02/08/19 07:28  02/08/19 07:28  02/08/19 07:28








 Laboratory Results





 02/08/19 04:45 





 02/08/19 04:45 











ICD10 Worksheet


Patient Problems: 


 Problems











Problem Status Onset


 


Effusion, left knee Acute  


 


Hypokalemia Acute  


 


Hyponatremia Acute  


 


Leukocytosis Acute  


 


UTI (urinary tract infection) Acute  


 


Weakness Acute

## 2019-02-08 NOTE — PCMIDPN
Assessment/Plan: 


1. Complicated MSSA bacteremia with multiple metastatic foci, including 

multiple epidural abscesses status post laminectomy/washout at T4-5, T9-T10, L4-

5, bilateral ileopsoas abscesses R>L, paraspinal abscesses L3-S1 status post IR 

drainage of left gluteal abscess and right ileopsoas abscess:


Continue nafcillin as is, which she is tolerating well.  Repeat blood cultures 

are pending.  TTE was a technically limited study given body habitus, but no 

obvious vegetations noted.  She does have a systolic murmur at the right upper 

sternal border; if blood cultures remain positive, would strongly consider SALAS.

  Will obtain EKG today as well for completeness.  No evidence of intrarenal or 

perinephric abscess on CT in the setting of concomitant Staph aureus 

bacteriuria.  Would repeat pelvic CT moving forward, likely on Monday to 

further assess gluteal/ileo psoas abscesses given possible need for operative 

intervention.  Appreciate neurosurgical/General surgery assistance.


2. Miscellaneous:


Immunoglobulins are pending.  HIV negative.





Over 25 min spent with this patient today.






































Subjective: 


Still uncomfortable, but overall is feeling better.  No rash, nausea, vomiting 

abdominal pain or diarrhea.  No visual disturbance, chest pain, or shortness of 

breath.  No tingling or numbness in her extremities or loss of strength.  Pain 

when flexes her right leg.





Objective: 


Nafcillin 2 g IV q.4 hours day 3


T-max 37 degrees Vital Signs











Temp Pulse Resp BP Pulse Ox


 


 36.8 C   88   18   99/57 L  95 


 


 02/08/19 11:33  02/08/19 11:33  02/08/19 11:33  02/08/19 11:33  02/08/19 11:33








 Microbiology











 02/06/19 14:46 Gram Stain - Final





 Chest - Eswab 


 


 02/06/19 14:46 Gram Stain - Final





 Back - Eswab 


 


 02/07/19 12:52 Mycobacterial Smear (SHAAN) - Final





 Pelvis - Aspirate 


 


 02/07/19 13:05 Mycobacterial Smear (SHAAN) - Final





 Back - Aspirate 


 


 02/04/19 18:23 Gram Stain - Final





 Knee - Aspirate Body Fluid Culture - Final


 


 02/07/19 13:05 Gram Stain - Final





 Back - Aspirate 


 


 02/07/19 12:52 Gram Stain - Final





 Pelvis - Aspirate 


 


 02/04/19 18:25 Blood Culture - Final





 Blood    Staphylococcus Aureus


 


 02/04/19 18:25 Blood Culture - Final





 Blood    Staphylococcus Aureus





 Blood Panel (PCR) - Final





    S.aureus Methicillin Suscept.








 Laboratory Results





 02/08/19 04:45 





 02/08/19 04:45 





 











 02/07/19 02/08/19 02/09/19





 05:59 05:59 05:59


 


Intake Total 4683 3938 450


 


Output Total 2673 2355 


 


Balance 2010 1583 450








 











ESR  72 MM/HR (0-30)  H  02/05/19  04:58    


 


C-Reactive Protein  204.9 mg/L (<10.0)  H  02/05/19  04:58    








Blood cultures February 8th x2 pending


Blood cultures February 4th with MSSA, urine with MSSA, multiple epidural sites 

positive for gram-positive cocci in clusters





- Physical Exam


General Appearance: obese, other (Mildly disheveled)


EENT: pharynx normal, No thrush


Respiratory: lungs clear


Cardiac/Chest: systolic murmur (1 to 2/6 systolic ejection murmur heard at the 

right upper sternal border)


Extremities: other (Sternoclavicular joints, elbows, wrists, knees, ankles 

without abnormalities)


Abdomen: non-tender, soft


Back: other (Patient had difficulty rolling over for me secondary to discomfort

; was not able to look at her back incisions.  PASQUALE drains through buttocks left 

and right)


Skin: other (Some deep fissures in her heels, right greater than left plantar 

aspects), No embolic lesions


Neuro/Psych: oriented x 3





ICD10 Worksheet


Patient Problems: 


 Problems











Problem Status Onset


 


Effusion, left knee Acute  


 


Hypokalemia Acute  


 


Hyponatremia Acute  


 


Leukocytosis Acute  


 


UTI (urinary tract infection) Acute  


 


Weakness Acute

## 2019-02-08 NOTE — ASMTCMCOM
CM Note

 

CM Note                       

Notes:

Patient's white blood cell count still elevated at 27,000. Cultures from chest and back abscesses 

are all pending. Patient needs abx for acute sepsis. Therapies still evaluating. CM will follow.

 

Date Signed:  02/08/2019 11:14 AM

Electronically Signed By:Martha Sanders LCSW

## 2019-02-09 LAB — PLATELET # BLD: 337 10^3/UL (ref 150–400)

## 2019-02-09 PROCEDURE — 30233N1 TRANSFUSION OF NONAUTOLOGOUS RED BLOOD CELLS INTO PERIPHERAL VEIN, PERCUTANEOUS APPROACH: ICD-10-PCS | Performed by: INTERNAL MEDICINE

## 2019-02-09 RX ADMIN — DOCUSATE SODIUM AND SENNOSIDES SCH TAB: 50; 8.6 TABLET ORAL at 20:32

## 2019-02-09 RX ADMIN — NAFCILLIN SCH MLS: 2 INJECTION, POWDER, FOR SOLUTION INTRAMUSCULAR; INTRAVENOUS at 22:56

## 2019-02-09 RX ADMIN — NAFCILLIN SCH MLS: 2 INJECTION, POWDER, FOR SOLUTION INTRAMUSCULAR; INTRAVENOUS at 15:16

## 2019-02-09 RX ADMIN — OXYCODONE HYDROCHLORIDE PRN MG: 15 TABLET ORAL at 19:35

## 2019-02-09 RX ADMIN — NAFCILLIN SCH MLS: 2 INJECTION, POWDER, FOR SOLUTION INTRAMUSCULAR; INTRAVENOUS at 10:10

## 2019-02-09 RX ADMIN — SODIUM CHLORIDE SCH MLS: 900 INJECTION, SOLUTION INTRAVENOUS at 09:04

## 2019-02-09 RX ADMIN — NAFCILLIN SCH MLS: 2 INJECTION, POWDER, FOR SOLUTION INTRAMUSCULAR; INTRAVENOUS at 05:26

## 2019-02-09 RX ADMIN — OXYCODONE HYDROCHLORIDE PRN MG: 15 TABLET ORAL at 08:36

## 2019-02-09 RX ADMIN — DOCUSATE SODIUM AND SENNOSIDES SCH: 50; 8.6 TABLET ORAL at 08:51

## 2019-02-09 RX ADMIN — NAFCILLIN SCH MLS: 2 INJECTION, POWDER, FOR SOLUTION INTRAMUSCULAR; INTRAVENOUS at 17:55

## 2019-02-09 RX ADMIN — NAFCILLIN SCH MLS: 2 INJECTION, POWDER, FOR SOLUTION INTRAMUSCULAR; INTRAVENOUS at 02:08

## 2019-02-09 NOTE — NEUSURGPN
Date of Surgery: 02/06/19


Post Op Day: 3


Assessment/Plan: 


Assessment: 57 yo female sp T4-5, T9-10, L4-5 laminectomy for washout of 

epidural abscess POD3





Plan:


-Optimize pain management


-PT/OT, encouraged increased activity


-Follow ID recs, IV abx


-Continue PASQUALE X1


-Patient discussed with Dr Tillman 


-Please call with any questions/concerns 





Subjective: 


Having localized  back pain.  No Leg pain.


Objective: 


Awake. Alert. PERRL. EOMI


Speech fluent


Facial expression symmetrical


LE weakness diffusely at 4/5





- Physician


Discussed Patient with : Primo





Neurosurgery Physical Exam





- Vitals, I&O, Labs





 I and O











 02/08/19 02/09/19 02/10/19





 05:59 05:59 05:59


 


Intake Total 3938 2130 


 


Output Total 2355 2515 


 


Balance 1583 -385 


 


Intake:   


 


  Oral (ml) 1250 1500 


 


  IV Intake (ml) 1300 630 


 


  IV Infused (ml) 1388  


 


    Ns 1,000 ml @ 100 mls/hr 1388  





    IV CONT ANGELAL Rx#:   





    [id number]   


 


Output:   


 


  Urine (ml) 2300 2475 


 


    Catheter 2300 2475 


 


  PASQUALE Drain Output (ml) 55 40 


 


    #1 Posterior Back 30 40 


 


    #2 Posterior Sacrum 10 0 


 


    Back 15 0 


 


Other:   


 


  Number of Voids   


 


    Catheter 1  


 


  Number of Stools   


 


    Catheter 0 1 








 Microbiology











 02/07/19 12:52 Gram Stain - Final





 Pelvis - Aspirate 


 


 02/07/19 13:05 Gram Stain - Final





 Back - Aspirate 


 


 02/07/19 12:52 Mycobacterial Smear (SHAAN) - Final





 Pelvis - Aspirate 


 


 02/06/19 14:46 Gram Stain - Final





 Chest - Eswab 


 


 02/06/19 14:46 Gram Stain - Final





 Back - Eswab 


 


 02/07/19 13:05 Mycobacterial Smear (SHAAN) - Final





 Back - Aspirate 


 


 02/04/19 18:23 Gram Stain - Final





 Knee - Aspirate Body Fluid Culture - Final








 Vital Signs











Temp Pulse Resp BP Pulse Ox


 


 36.7 C   91   20   102/63   97 


 


 02/09/19 07:11  02/09/19 07:11  02/09/19 07:11  02/09/19 07:11  02/09/19 07:11








 Laboratory Results





 02/09/19 06:25 





 02/09/19 06:25 











ICD10 Worksheet


Patient Problems: 


 Problems











Problem Status Onset


 


Effusion, left knee Acute  


 


Hypokalemia Acute  


 


Hyponatremia Acute  


 


Leukocytosis Acute  


 


UTI (urinary tract infection) Acute  


 


Weakness Acute

## 2019-02-09 NOTE — PCMIDPN
Assessment/Plan: 


1. Complicated MSSA bacteremia with multiple metastatic foci, including 

multiple epidural abscesses status post laminectomy/washout at T4-5, T9-T10, L4-

5, bilateral ileopsoas abscesses R>L, paraspinal abscesses L3-S1 status post IR 

drainage of left gluteal abscess and right ileopsoas abscess:


Patient has ongoing Staph aureus bacteremia on repeat blood cultures, 

necessitating need for repeat evaluation of any undrained foci.  Will obtain CT 

scan of the abdomen and pelvis today with IV contrast to assess size of 

bilateral iliopsoas abscesses and left gluteal abscess.  Patient may need 

operative drainage.  Will also look for evidence of splenic abscess.  I do feel 

that the patient needs a SALAS in the setting of ongoing Staph aureus bacteremia 

to convince ourselves she does not have left-sided endocarditis.  Will order 

this for tomorrow.


2. Miscellaneous:


Immunoglobulins and HIV testing normal.





Over 25 min spent with this patient today.


Case discussed with hospitalist.



































02/09/19 12:08





02/09/19 12:15





Subjective: 


Patient is getting a blood transfusion.  Very lethargic. Plethoric facies.  

States that she hurts all over.  Very difficult to get a history from.  Also 

states that she is extremely tired, she did not sleep last night.  Says back 

pain is stable.





Objective: 


Nafcillin 2 g IV q.4 hours day for


No fevers


Drain output in left gluteal abscess and right iliopsoas abscess now 

essentially nothing Vital Signs











Temp Pulse Resp BP Pulse Ox


 


 36.5 C   84   16   108/59 L  91 L


 


 02/09/19 11:43  02/09/19 11:43  02/09/19 11:43  02/09/19 11:43  02/09/19 11:43








 Microbiology











 02/07/19 13:05 Gram Stain - Final





 Back - Aspirate 


 


 02/07/19 12:52 Gram Stain - Final





 Pelvis - Aspirate 


 


 02/06/19 14:46 Gram Stain - Final





 Back - Eswab 


 


 02/06/19 14:46 Gram Stain - Final





 Chest - Eswab 


 


 02/04/19 18:23 Gram Stain - Final





 Knee - Aspirate Body Fluid Culture - Final


 


 02/07/19 12:52 Mycobacterial Smear (SHAAN) - Final





 Pelvis - Aspirate 


 


 02/07/19 13:05 Mycobacterial Smear (SHAAN) - Final





 Back - Aspirate 








 Laboratory Results





 02/09/19 06:25 





 02/09/19 06:25 





 











 02/08/19 02/09/19 02/10/19





 05:59 05:59 05:59


 


Intake Total 3938 2130 


 


Output Total 8178 0825 


 


Balance 1583 -385 








 











ESR  72 MM/HR (0-30)  H  02/05/19  04:58    


 


C-Reactive Protein  204.9 mg/L (<10.0)  H  02/05/19  04:58    








Repeat blood cultures February 8th Gram-positive cocci in clusters


Other sites that were drained including T and L-spine with MSSA.


Left knee aspirate is sterile


EKG shows normal sinus rhythm with normal p.r interval





- Physical Exam


General Appearance: obese, other (Very tired, slightly difficult to arouse but 

once aroused, making conversation appropriately.)


EENT: other (Neck is supple.  No scleral icterus or petechia A. Mucous 

membranes are quite dry)


Respiratory: lungs clear


Neck: other (Right IJ in place)


Cardiac/Chest: systolic murmur (Right upper sternal border)


Extremities: pedal edema, other


Abdomen: non-tender, soft


Back: other (Dressing from 3 surgical sites removed.  No claudia-incisional 

erythema or active drainage.  Patient also has a PASQUALE drain in the right buttock 

and left buttock.  Bulbs are practically empty)


Skin: No embolic lesions


Neuro/Psych: oriented x 3





ICD10 Worksheet


Patient Problems: 


 Problems











Problem Status Onset


 


Effusion, left knee Acute  


 


Hypokalemia Acute  


 


Hyponatremia Acute  


 


Leukocytosis Acute  


 


UTI (urinary tract infection) Acute  


 


Weakness Acute

## 2019-02-09 NOTE — HOSPPROG
Hospitalist Progress Note


Assessment/Plan: 





Complicated MSSA bacteremia with multiple metastatic foci, including multiple 

epidural abscesses status post laminectomy/washout at T4-5, T9-T10, L4-5, 

bilateral ileopsoas abscesses R>L, paraspinal abscesses L3-S1 status post IR 

drainage of left gluteal abscess and right ileopsoas abscess:


Patient has ongoing Staph aureus bacteremia on repeat blood cultures, 

necessitating need for repeat evaluation of any undrained foci.  Will obtain CT 

scan of the abdomen and pelvis today with IV contrast to assess size of 

bilateral iliopsoas abscesses and left gluteal abscess.  Patient may need 

operative drainage.  Will also look for evidence of splenic abscess.  I do feel 

that the patient needs a SALAS in the setting of ongoing Staph aureus bacteremia 

to convince ourselves she does not have left-sided endocarditis.  Will order 

this for tomorrow.


2. Miscellaneous:


Immunoglobulins and HIV testing normal.








This patient comes in with diffuse multiple MSSA abscess and bacteremia, very 

weak and poor food intake as she was stuck in bed at home for 2 weeks, s/p 

multiple procedures. First encounter, chart reviewed. D/W Dr Hall.





#Acute sepsis with organ failure 


-resolved





#Anemia


-transfuse today


-follow labs





#MSSA bacteremia


-on going bactermia





#Bilateral psoas abscesses


-drains placed in IR 





#Large epidural abscess, and paraspinous abscesses


-extensive surgical incision drainage and exploration of back 2/6 required 3 

seperate incisions





#Suspicion for endocarditis, no veg on TTE


-SALAS for Monday





#Acute polyarticular inflammatory arthropathy with effusion in knee, concern 

for septic arthritis in any of these joints





#Acute kidney injury


-improving nicely with treatment here so far





#Hyponatremia, hypovolemic with low urine sodium


-slowly improving on current therapy but remains low sodium





#Acute metabolic encephalopathy due to all of above; 


-resolved





#Hypokalemia


-resolved





#Pyuria of undetermined significance with no symptoms





#Generalized weakness and deconditioning due to above


-PT/OT





#Chronic back pain


-stable





#Dispo


-unclear


-needs further evaluation


-follow labs


-SALAS


Subjective: Feeling tired and weak. Some pain. Trying to eat.


Objective: 


 Vital Signs











Temp Pulse Resp BP Pulse Ox


 


 36.5 C   84   16   108/59 L  91 L


 


 02/09/19 11:43  02/09/19 11:43  02/09/19 11:43  02/09/19 11:43  02/09/19 11:43








 Microbiology











 02/07/19 13:05 Gram Stain - Final





 Back - Aspirate 


 


 02/07/19 12:52 Gram Stain - Final





 Pelvis - Aspirate 


 


 02/06/19 14:46 Gram Stain - Final





 Back - Eswab 


 


 02/06/19 14:46 Gram Stain - Final





 Chest - Eswab 


 


 02/04/19 18:23 Gram Stain - Final





 Knee - Aspirate Body Fluid Culture - Final


 


 02/07/19 12:52 Mycobacterial Smear (SHAAN) - Final





 Pelvis - Aspirate 


 


 02/07/19 13:05 Mycobacterial Smear (SHAAN) - Final





 Back - Aspirate 








 Laboratory Results





 02/09/19 06:25 





 02/09/19 06:25 





 











 02/08/19 02/09/19 02/10/19





 05:59 05:59 05:59


 


Intake Total 3938 2130 


 


Output Total 2355 2515 


 


Balance 1583 -385 








 











PT  16.6 SEC (12.0-15.0)  H  02/07/19  05:20    


 


INR  1.32  (0.83-1.16)  H  02/07/19  05:20    














- Physical Exam


Constitutional: chronically ill appearing, obese, uncomfortable


Eyes: PERRL, anicteric sclera, EOMI


Ears, Nose, Mouth, Throat: moist mucous membranes, hearing normal, ears appear 

normal


Cardiovascular: No JVD, No tachycardia, No edema


Respiratory: no respiratory distress, no rales or rhonchi, reduced air movement


Gastrointestinal: normoactive bowel sounds, No tenderness, No ascites


Skin: warm, normal color, No mottled


Musculoskeletal: normal joint ROM, no joint effusions, generalized weakness


Neurologic: AAOx3


Psychiatric: interacting appropriately, not anxious, not encephalopathic





ICD10 Worksheet


Patient Problems: 


 Problems











Problem Status Onset


 


Effusion, left knee Acute  


 


Hyponatremia Acute  


 


Hypokalemia Acute  


 


Weakness Acute  


 


Leukocytosis Acute  


 


UTI (urinary tract infection) Acute

## 2019-02-10 LAB — PLATELET # BLD: 388 10^3/UL (ref 150–400)

## 2019-02-10 PROCEDURE — 0R9M3ZZ DRAINAGE OF LEFT ELBOW JOINT, PERCUTANEOUS APPROACH: ICD-10-PCS | Performed by: ORTHOPAEDIC SURGERY

## 2019-02-10 RX ADMIN — OXYCODONE HYDROCHLORIDE PRN MG: 15 TABLET ORAL at 21:16

## 2019-02-10 RX ADMIN — NAFCILLIN SCH MLS: 2 INJECTION, POWDER, FOR SOLUTION INTRAMUSCULAR; INTRAVENOUS at 09:53

## 2019-02-10 RX ADMIN — OXYCODONE HYDROCHLORIDE PRN MG: 15 TABLET ORAL at 08:35

## 2019-02-10 RX ADMIN — NAFCILLIN SCH MLS: 2 INJECTION, POWDER, FOR SOLUTION INTRAMUSCULAR; INTRAVENOUS at 21:17

## 2019-02-10 RX ADMIN — DOCUSATE SODIUM AND SENNOSIDES SCH TAB: 50; 8.6 TABLET ORAL at 21:15

## 2019-02-10 RX ADMIN — DOCUSATE SODIUM AND SENNOSIDES SCH: 50; 8.6 TABLET ORAL at 09:24

## 2019-02-10 RX ADMIN — NAFCILLIN SCH MLS: 2 INJECTION, POWDER, FOR SOLUTION INTRAMUSCULAR; INTRAVENOUS at 18:50

## 2019-02-10 RX ADMIN — NAFCILLIN SCH MLS: 2 INJECTION, POWDER, FOR SOLUTION INTRAMUSCULAR; INTRAVENOUS at 02:17

## 2019-02-10 RX ADMIN — NAFCILLIN SCH MLS: 2 INJECTION, POWDER, FOR SOLUTION INTRAMUSCULAR; INTRAVENOUS at 14:23

## 2019-02-10 RX ADMIN — NAFCILLIN SCH MLS: 2 INJECTION, POWDER, FOR SOLUTION INTRAMUSCULAR; INTRAVENOUS at 06:25

## 2019-02-10 NOTE — NEUSURGPN
Date of Surgery: 02/06/19


Post Op Day: 4


Assessment/Plan: 


Assessment: 57 yo female sp T4-5, T9-10, L4-5 laminectomy for washout of 

epidural abscess POD4





Plan:


-Optimize pain management


-PT/OT, encouraged increased activity


-Follow ID recs, IV abx


-PASQUALE X1


-SALAS on Monday


-Patient discussed with Dr Tillman 


-Please call with any questions/concerns 





Subjective: 


No new complaints.  No LE pain.


Objective: 


Awake. Alert. PERRL. EOMI


Following commands


LE strength diffusely 4+/5 





- Physician


Discussed Patient with Dr.: Tillman





Neurosurgery Physical Exam





- Vitals, I&O, Labs





 I and O











 02/09/19 02/10/19 02/11/19





 05:59 05:59 05:59


 


Intake Total 2130 1400 1218


 


Output Total 2515 1180 


 


Balance -


 


Intake:   


 


  Oral (ml) 1500 1400 


 


  IV Intake (ml) 630  


 


  IV Infused (ml)   1218


 


    Nafcillin Sodium 2 gm In   300





    D5w 100 ml @ 100 mls/hr   





    IV Q4HRS ANGELLA Rx#:   





    [id number]   


 


    Ns 1,000 ml @ 100 mls/hr   918





    IV CONT ANGELLA Rx#:   





    [id number]   


 


Output:   


 


  Urine (ml) 2475 1150 


 


    Catheter 2475 1150 


 


  PASQUALE Drain Output (ml) 40 30 


 


    #1 Posterior Back 40 30 


 


    #2 Posterior Sacrum 0  


 


    Back 0  


 


Other:   


 


  Number of Stools   


 


    Bedside Commode  1 


 


    Catheter 1  








 Microbiology











 02/07/19 12:52 Gram Stain - Final





 Pelvis - Aspirate 


 


 02/07/19 13:05 Gram Stain - Final





 Back - Aspirate 


 


 02/06/19 14:46 Gram Stain - Final





 Back - Eswab 


 


 02/06/19 14:46 Gram Stain - Final





 Chest - Eswab 


 


 02/04/19 18:23 Gram Stain - Final





 Knee - Aspirate Body Fluid Culture - Final








 Vital Signs











Temp Pulse Resp BP Pulse Ox


 


 37.1 C   92   21 H  120/85 H  94 


 


 02/10/19 07:37  02/10/19 07:37  02/10/19 07:37  02/10/19 07:37  02/10/19 07:37








 Laboratory Results





 02/10/19 06:38 





 02/10/19 06:38 











ICD10 Worksheet


Patient Problems: 


 Problems











Problem Status Onset


 


Effusion, left knee Acute  


 


Hypokalemia Acute  


 


Hyponatremia Acute  


 


Leukocytosis Acute  


 


UTI (urinary tract infection) Acute  


 


Weakness Acute

## 2019-02-10 NOTE — PDANEPAE
ANE History of Present Illness





59 y/o female for I and D of left elbow abscess.  She was in her usual state of 

health until 1/23/19 when she developed pain in her back and leg.  Eventually 

determined to have epidural abscess and abscesses in multiple other locations. 

She underwent I and D of epidural abscesses previous to today. History 

otherwise significant only for htn and obesity. Initially had been found septic 

with renal insufficiency.  Now renal function recovering.





ANE Past Medical History





- Cardiovascular History


Hx Hypertension: Yes


Hx Arrhythmias: No


Hx Chest Pain: No


Hx Coronary Artery / Peripheral Vascular Disease: No


Hx CHF / Valvular Disease: No


Hx Palpitations: No





- Pulmonary History


Hx COPD: No


Hx Asthma/Reactive Airway Disease: No


Hx Recent Upper Respiratory Infection: No


Hx Oxygen in Use at Home: No


Hx Sleep Apnea: No


Sleep Apnea Screening Result - Last Documented: Negative





- Endocrine History


Hx Diabetes: No


Hypothyroid: No





- Chronic Pain History


Chronic Pain: No





ANE Review of Systems


Review of systems is: negative


Review of Systems: 








ANE Patient History





- Allergies


Allergies/Adverse Reactions: 








No Known Allergies Allergy (Unverified 02/04/19 15:35)


 








- Home Medications


Home Medications: 








Hydrochlorothiazide [HCTZ (*)] 25 mg PO DAILY 01/24/19 [Last Taken Unknown]


Lisinopril [Zestril 10 mg (*)] 10 mg PO DAILY 02/04/19 [Last Taken 02/02/19]








- NPO status


NPO Since - Liquids (Date): 02/10/19


NPO Since - Liquids (Time): 09:30


NPO Since - Solids (Date): 02/10/19


NPO Since - Solids (Time): 09:20





- Smoking Hx


Smoking Status: Never smoked





- Alcohol Use


Alcohol Use: Occasionally





ANE Labs/Vital Signs





- Labs


Result Diagrams: 


 02/10/19 06:38





 02/10/19 06:38





- Vital Signs


Blood Pressure: 132/76


Heart Rate: 92


Respiratory Rate: 18


O2 Sat (%): 97


Height: 160.02 cm


Weight: 79.8 kg





ANE Physical Exam





- Airway


Neck exam: FROM


Mallampati Score: Class 2


Mouth exam: poor dentition





- Pulmonary


Pulmonary: no respiratory distress





- Cardiovascular


Cardiovascular: regular rate and rhythym





- ASA Status


ASA Status: III, E





ANE Anesthesia Plan


Anesthesia Plan: general endotracheal anesthesia

## 2019-02-10 NOTE — HOSPPROG
Hospitalist Progress Note


Assessment/Plan: 





This patient comes in with diffuse multiple MSSA abscess and bacteremia, very 

weak and poor food intake as she was stuck in bed at home for 2 weeks, s/p 

multiple procedures. D/W Dr Hall.





#Acute sepsis with organ failure 


-resolved





#Anemia


-transfused


-follow labs





#MSSA bacteremia


-on going bactermia





#Bilateral psoas abscesses


-drains placed in IR 


-need repositioned





#New left elbow flare


-to OR today with Dr Hilton





#Large epidural abscess, and paraspinous abscesses


-extensive surgical incision drainage and exploration of back 2/6 required 3 

seperate incisions





#Suspicion for endocarditis, no veg on TTE


-SALAS for Monday





#Acute polyarticular inflammatory arthropathy with effusion in knee, concern 

for septic arthritis in any of these joints





#Acute kidney injury


-improving nicely with treatment here so far





#Hyponatremia, hypovolemic with low urine sodium


-slowly improving on current therapy but remains low sodium





#Acute metabolic encephalopathy due to all of above; 


-resolved





#Hypokalemia


-resolved





#Pyuria of undetermined significance with no symptoms





#Generalized weakness and deconditioning due to above


-PT/OT





#Chronic back pain


-stable





#Dispo


-unclear


-needs further evaluation


-follow labs


-SALAS


-reposition drains


-OR for elbow


Subjective: Very tired today. Still having pain. No other issues.


Objective: 


 Vital Signs











Temp Pulse Resp BP Pulse Ox


 


 36.7 C   92   18   132/76 H  97 


 


 02/10/19 11:39  02/10/19 11:39  02/10/19 11:39  02/10/19 11:39  02/10/19 11:39








 Microbiology











 02/07/19 13:05 Gram Stain - Final





 Back - Aspirate 


 


 02/07/19 12:52 Gram Stain - Final





 Pelvis - Aspirate 


 


 02/06/19 14:46 Gram Stain - Final





 Back - Eswab 


 


 02/06/19 14:46 Gram Stain - Final





 Chest - Eswab 


 


 02/04/19 18:23 Gram Stain - Final





 Knee - Aspirate Body Fluid Culture - Final








 Laboratory Results





 02/10/19 06:38 





 02/10/19 06:38 





 











 02/09/19 02/10/19 02/11/19





 05:59 05:59 05:59


 


Intake Total 2130 1400 1218


 


Output Total 2515 1180 


 


Balance -








 











PT  16.6 SEC (12.0-15.0)  H  02/07/19  05:20    


 


INR  1.32  (0.83-1.16)  H  02/07/19  05:20    














- Physical Exam


Constitutional: appears nourished, chronically ill appearing, obese


Eyes: PERRL, anicteric sclera, EOMI


Ears, Nose, Mouth, Throat: moist mucous membranes, hearing normal, ears appear 

normal


Cardiovascular: regular rate and rhythym, edema, No JVD, No tachycardia


Respiratory: no respiratory distress, no rales or rhonchi, reduced air movement


Gastrointestinal: normoactive bowel sounds, No tenderness, No ascites


Skin: warm, erythema, No mottled


Musculoskeletal: normal joint ROM, no joint effusions, joint tenderness, pain 

with ROM, generalized weakness


Neurologic: AAOx3


Psychiatric: interacting appropriately, not anxious, not encephalopathic, 

thought process linear





ICD10 Worksheet


Patient Problems: 


 Problems











Problem Status Onset


 


Effusion, left knee Acute  


 


Hyponatremia Acute  


 


Hypokalemia Acute  


 


Weakness Acute  


 


Leukocytosis Acute  


 


UTI (urinary tract infection) Acute

## 2019-02-10 NOTE — GOP
[f rep st]



                                                                OPERATIVE REPORT





DATE OF OPERATION:  02/10/2019



SURGEON:  Geno Hilton MD



ANESTHESIA:  LMA.



PREOPERATIVE DIAGNOSIS:  Left elbow infection.



POSTOPERATIVE DIAGNOSIS:  Left elbow infection.



PROCEDURE PERFORMED:  Incision and drainage of left olecranon bursa.



FINDINGS:  





INDICATIONS:  This is a 58-year-old female with a complex history of metastatic infections throughout
 her body.  She has developed redness and swelling into the olecranon bursa.  I was asked to see the 
patient for further evaluation.  It was decided to take her to the operating room to undergo formal I
 and D.



DESCRIPTION OF PROCEDURE:  Patient was brought to the operating room after the left side had been rica
ntified as the correct side by the patient, nurse, and physician.  Once in the operating room, she wa
s placed under general anesthesia using an LMA.  Once asleep, a tourniquet was placed around the uppe
r portion of the left arm and the left upper extremity sterilely prepped and draped in the usual [facility]
ion using GSI solution.  Once prepped and draped, limb was elevated for 2 minutes in order to exsangu
inate.  A tourniquet was inflated to 250 mmHg.  A linear incision was made from the tip of the olecra
non heading distally.  Did not have purulent fluid from the area.  Cultures x2 were taken despite her
 being on antibiotics already just in case there was another resistant bacteria within the area.  3 L
 of antibiotic solution were then irrigated through the wound using a bulb syringe.  Once 3 L had bee
n irrigated through the wound and the area debrided bluntly, Penrose drain was put into place.  Since
 the sac tracked proximally, large pouch for the Penrose drain to sit within.  2-0 nylon suture in a 
vertical mattress type stitch was then used to close the skin except for the very distal portion wher
e the Penrose drain exited.  The wound was then dressed with 4x4s and Kerlix.  Tourniquet was deflate
d at 11 minutes.  The arm was completely undraped in the operating room, tourniquet removed from the 
arm, and Ace wrap placed around the elbow.  Patient was then woken up, extubated, transferred onto a 
bed, and sent to recovery room in good condition.



TOURNIQUET TIME:  11 minutes.





Job #:  [id number]

## 2019-02-10 NOTE — PCMIDPN
Assessment/Plan: 


1. Complicated MSSA bacteremia with multiple metastatic foci, including 

multiple epidural abscesses status post laminectomy/washout at T4-5, T9-T10, L4-

5, bilateral ileopsoas abscesses R>L, paraspinal abscesses L3-S1 status post IR 

drainage of left gluteal abscess and right ileopsoas abscess:


Given radiographic improvement in left piriformis and right ileopsoas abscess 

and the fact that surgery would be a major ordeal, would continue antibiotics 

as is without surgical intervention for remaining small abscesses at these 

sites presently.  Will need to discuss with interventionalists tomorrow whether 

drains, particularly left piriformis, can be repositioned. Also of note, she 

does have new fluctuance and erythema of the left olecranon bursa concerning 

for septic olecranon bursitis; this will need to be washed out.  Have placed a 

call to Orthopedics.  (Dr. Hilton.) She will have a SALAS in the morning given 

heart murmur and persistent Staph aureus bacteremia.  This was all explained to 

her, and she expressed understanding and agreement with this plan.


2. Miscellaneous:


Immunoglobulins and HIV testing normal.





Over 25 min spent with this patient today.
























































02/10/19 09:57





Subjective: 


Seems better this morning.  More energetic and alert.  States that she slept 

well last night.  Having some back pain, but she feels this is not out of the 

ordinary given recent surgery.  Denies focal weakness, tingling or numbness 

anywhere.  No shaking chills.  No diarrhea or rash.  Reviewed CT scan of the 

abdomen and pelvis with Dr. Hawkins this morning; piriformis abscess and right 

iliopsoas abscess are much smaller compared with previous, although residual 

small abscesses still remain.  No evidence of splenic abscess.





Objective: 


Nafcillin 2 g IV q.4 hours day 5


T-max 37.1 degrees Vital Signs


PASQUALE drains from ileus psoas and left piriformis abscess with minimal to no output


Lumbar drain with 30 cc out











Temp Pulse Resp BP Pulse Ox


 


 36.8 C   94   24 H  131/77 H  96 


 


 02/10/19 07:53  02/10/19 07:53  02/10/19 07:53  02/10/19 07:53  02/10/19 07:53








 Microbiology











 02/07/19 12:52 Gram Stain - Final





 Pelvis - Aspirate 


 


 02/07/19 13:05 Gram Stain - Final





 Back - Aspirate 


 


 02/06/19 14:46 Gram Stain - Final





 Back - Eswab 


 


 02/06/19 14:46 Gram Stain - Final





 Chest - Eswab 


 


 02/04/19 18:23 Gram Stain - Final





 Knee - Aspirate Body Fluid Culture - Final








 Laboratory Results





 02/10/19 06:38 





 02/10/19 06:38 





 











 02/09/19 02/10/19 02/11/19





 05:59 05:59 05:59


 


Intake Total 2130 1400 1218


 


Output Total 2515 1180 


 


Balance -








 











ESR  72 MM/HR (0-30)  H  02/05/19  04:58    


 


C-Reactive Protein  204.9 mg/L (<10.0)  H  02/05/19  04:58    








Blood cultures February 8th 2 sets 2/4 bottles MSSA





- Physical Exam


General Appearance: no apparent distress, obese


EENT: poor dentition, No scleral icterus, No thrush


Respiratory: lungs clear


Cardiac/Chest: tachycardia, systolic murmur (Right upper sternal border, left 

upper sternal border)


Extremities: other (Left elbow:  Bursa is swollen today with overlying erythema 

and fluctuance.  Tender.)


Abdomen: non-tender, soft


Back: other (PASQUALE drain right buttock, left buttock, and sacral area.  Bulbs are 

mostly empty.  Incisions look fine with no active drainage or significant 

erythema.)


Skin: No rash, No embolic lesions


Neuro/Psych: no motor/sensory deficits, oriented x 3





ICD10 Worksheet


Patient Problems: 


 Problems











Problem Status Onset


 


Effusion, left knee Acute  


 


Hypokalemia Acute  


 


Hyponatremia Acute  


 


Leukocytosis Acute  


 


UTI (urinary tract infection) Acute  


 


Weakness Acute

## 2019-02-10 NOTE — POSTOPPROG
Post Op Note


Date of Operation: 02/10/19


Surgeon: Geno Hilton


Anesthesia: GET(General Endotracheal)


Pre-op Diagnosis: l elbow infection


Procedure: i&d left elbow


Inf/Abcess present in the surg proc area at time of surgery?: Yes


Depth: Superfical  (Skin SQ)


EBL: 


Drains: Penrose

## 2019-02-10 NOTE — ASMTCMCOM
CM Note

 

CM Note                       

Notes:

Patient was not in room when CM attempted to meet with patient. 



CM discussed case with LAI Red. Patient underwent surgery today to drain abscess  OT 

recommending SNF (2/8), PT recommending SNF vs Inpatient Rehab & patient has been refusing PT 

x2days. Neda states patient is refusing SNF. CM to follow. 



D/C Plan: TBD

 

Date Signed:  02/10/2019 05:22 PM

Electronically Signed By:Jud Cooper

## 2019-02-10 NOTE — GCON
[f rep st]



                                                                    CONSULTATION





INPATIENT CONSULT REPORT



DATE OF CONSULTATION:  02/10/2019



CURRENT COMPLAINT:  Left elbow pain and redness.



HISTORY OF PRESENT ILLNESS:  The patient is a 58-year-old female with a complex history of multiple i
nfectious abscesses throughout her body that have been drained, mostly by Neurosurgery; however, she 
has developed pain, redness, and swelling at the left olecranon.  I was asked to see the patient for 
further evaluation.



PHYSICAL EXAM:  The patient has redness and a ballotable mass at the tip of the olecranon.  There is 
no drainage and no abrasion at this time.



ASSESSMENT AND PLAN:  Patient is status post left olecranon bursa infection.  It was decided to take 
her to the operating room to undergo formal incision and drainage.  She will be brought to the operat
ing room as soon as she has been cleared by Anesthesia, based on her nothing by mouth status.





Job #:  [id number]

## 2019-02-11 PROCEDURE — B245ZZ4 ULTRASONOGRAPHY OF LEFT HEART, TRANSESOPHAGEAL: ICD-10-PCS | Performed by: PEDIATRICS

## 2019-02-11 PROCEDURE — 05H533Z INSERTION OF INFUSION DEVICE INTO RIGHT SUBCLAVIAN VEIN, PERCUTANEOUS APPROACH: ICD-10-PCS

## 2019-02-11 RX ADMIN — NAFCILLIN SCH MLS: 2 INJECTION, POWDER, FOR SOLUTION INTRAMUSCULAR; INTRAVENOUS at 08:58

## 2019-02-11 RX ADMIN — OXYCODONE HYDROCHLORIDE PRN MG: 15 TABLET ORAL at 07:47

## 2019-02-11 RX ADMIN — DOCUSATE SODIUM AND SENNOSIDES SCH TAB: 50; 8.6 TABLET ORAL at 21:16

## 2019-02-11 RX ADMIN — NAFCILLIN SCH MLS: 2 INJECTION, POWDER, FOR SOLUTION INTRAMUSCULAR; INTRAVENOUS at 14:21

## 2019-02-11 RX ADMIN — NAFCILLIN SCH MLS: 2 INJECTION, POWDER, FOR SOLUTION INTRAMUSCULAR; INTRAVENOUS at 18:13

## 2019-02-11 RX ADMIN — OXYCODONE HYDROCHLORIDE PRN MG: 15 TABLET ORAL at 14:20

## 2019-02-11 RX ADMIN — DOCUSATE SODIUM AND SENNOSIDES SCH: 50; 8.6 TABLET ORAL at 10:15

## 2019-02-11 RX ADMIN — NAFCILLIN SCH MLS: 2 INJECTION, POWDER, FOR SOLUTION INTRAMUSCULAR; INTRAVENOUS at 02:21

## 2019-02-11 RX ADMIN — NAFCILLIN SCH MLS: 2 INJECTION, POWDER, FOR SOLUTION INTRAMUSCULAR; INTRAVENOUS at 05:34

## 2019-02-11 RX ADMIN — OXYCODONE HYDROCHLORIDE PRN MG: 15 TABLET ORAL at 21:16

## 2019-02-11 NOTE — HOSPPROG
Hospitalist Progress Note


Assessment/Plan: 





Ms Cortes is a patient with diffuse multiple MSSA abscess and bacteremia, very 

weak and poor food intake as she was stuck in bed at home for 2 weeks, s/p 

multiple procedures. First encounter, chart reviewed. Reviewed her care w Dr Ye.





#Acute sepsis with organ failure 


-resolved





#Anemia


-transfused





#MSSA bacteremia with multiple metastatic foci including multiple epidural 

abscesses, s/p lami and washout T4-5, T9-10,L4-5, bilateral iliopsoas abscesses 

and paraspinal abscess


-on going bacteremia


-drains placed  in for the psoas abscesses


-ID to place on continuous Nafcillin





#Olecranon bursitis


-I&D w Dr Hilton on 2/10





#Suspicion for endocarditis, no veg on TTE


-SALAS today w Dr Moralez





#hyperglycemia


-check an A1C


-family hx of diabetes





#Acute polyarticular inflammatory arthropathy with effusion in knee





#Acute kidney injury


-resolved





#Hyponatremia


-recheck urine studies today


-on fluid restriction, on normal saline, will check a CMP now





#Acute metabolic encephalopathy due to all of above 


-resolved





#Hypokalemia


-resolved





#Pyuria 


-has a rincon in, no symptoms


-due to the ongoing bacteremia - dc rincon





#Generalized weakness and deconditioning due to above


-PT/OT





#Chronic back pain


-stable





#obesity w a BMI of 31





#Dispo


-pending





Subjective: Chloe said she is feeling better but weak.


Objective: 


 Vital Signs











Temp Pulse Resp BP Pulse Ox


 


 36.7 C   89   20   125/73 H  94 


 


 02/11/19 07:23  02/11/19 07:23  02/11/19 07:23  02/11/19 07:23  02/11/19 07:23








 Microbiology











 02/10/19 14:35 Gram Stain - Final





 Elbow - Eswab 


 


 02/07/19 13:05 Gram Stain - Final





 Back - Aspirate 


 


 02/07/19 12:52 Gram Stain - Final





 Pelvis - Aspirate 








 Laboratory Results





 02/10/19 06:38 





 02/11/19 05:25 





 











 02/10/19 02/11/19 02/12/19





 05:59 05:59 05:59


 


Intake Total 1400 2938 


 


Output Total 1180 1460 


 


Balance 220 1478 








 











PT  16.6 SEC (12.0-15.0)  H  02/07/19  05:20    


 


INR  1.32  (0.83-1.16)  H  02/07/19  05:20    














- Physical Exam


Constitutional: obese, uncomfortable


Eyes: PERRL


Ears, Nose, Mouth, Throat: hearing normal


Cardiovascular: regular rate and rhythym


Respiratory: no respiratory distress


Gastrointestinal: normoactive bowel sounds, soft, non-tender abdomen


Genitourinary: rincon in urethra


Skin: warm


Musculoskeletal: generalized weakness


Neurologic: AAOx3, sensation intact bilaterally


Psychiatric: interacting appropriately





ICD10 Worksheet


Patient Problems: 


 Problems











Problem Status Onset


 


Effusion, left knee Acute  


 


Hypokalemia Acute  


 


Hyponatremia Acute  


 


Leukocytosis Acute  


 


UTI (urinary tract infection) Acute  


 


Weakness Acute

## 2019-02-11 NOTE — PCMIDPN
Assessment/Plan: 


Assessment:  58-year-old woman with complicated methicillin-susceptible Staph 

aureus bloodstream infection complicated by multilevel epidural abscess left 

elbow septic bursitis, and bilateral iliopsoas abscesses.  Although significant 

source control has been obtained at each area abscess and septic arthritis, she 

continues to have persistent bacteremia.  Suspect the right IJ central venous 

catheter may be a persistent source for bacteremia will have it removed today.  

Difficulty with peripheral access limits test reading and a midline or a PICC 

line which will have to be removed today and and 48-72 hours once the 

bloodstream infection does clear.





1. Complicated methicillin-susceptible Staph aureus bloodstream infection, 

initial source unclear


2. Multilevel epidural abscess with MSSA, status post laminectomy/washout at T4-

5, T10, L4-L5 on 2/6/2019


3. Status post iliopsoas and gluteal abscess drainage 2/7/2019 with IR


4. Status post incision and drainage left olecranon bursitis, 2/10/2019


5. Status post SALAS, 2/11/2019





Plan:


1. Continue nafcillin changed to 12 g Q 24 hr by continuous infusion once IJ 

line removed


2. Place right upper extremity midline or PICC line today if possible


3. Remove right IJ central venous catheter once PICC line placed


4. 2 blood cultures to be drawn off of new PICC line after right IJ removed


5. Remove Espinal catheter if feasible


6. Discussed in detail potential side effects of methadone which include rash, 

antibiotic associated diarrhea, C diff colitis, phlebitis 





Simone Tran MD


Infectious Diseases





02/11/19 14:30





Subjective: 


No fever or chills.  No diarrhea, nausea, or rash.  Her right elbow remains 

painful but no new joint pains or swelling.  Appetite improving.  Ambulated to 

the restroom today with minimal difficulty. 





Objective: 


 Vital Signs











Temp Pulse Resp BP Pulse Ox


 


 36.8 C   92   20   125/82 H  97 


 


 02/11/19 14:11  02/11/19 14:11  02/11/19 14:11  02/11/19 14:11  02/11/19 14:11








 Microbiology











 02/07/19 13:05 Gram Stain - Final





 Back - Aspirate 


 


 02/07/19 12:52 Gram Stain - Final





 Pelvis - Aspirate 


 


 02/06/19 14:46 Gram Stain - Final





 Back - Eswab 


 


 02/06/19 14:46 Gram Stain - Final





 Chest - Eswab 


 


 02/04/19 18:23 Gram Stain - Final





 Knee - Aspirate Body Fluid Culture - Final


 


 02/10/19 14:35 Gram Stain - Final





 Elbow - Eswab 








 Laboratory Results





 02/10/19 06:38 





 02/11/19 05:25 





 











 02/10/19 02/11/19 02/12/19





 05:59 05:59 05:59


 


Intake Total 1400 2938 


 


Output Total 1180 1460 250


 


Balance 220 1478 -250








 











ESR  72 MM/HR (0-30)  H  02/05/19  04:58    


 


C-Reactive Protein  204.9 mg/L (<10.0)  H  02/05/19  04:58    








 Laboratory Tests











  02/07/19 02/08/19 02/08/19





  05:20 04:45 04:45


 


WBC   20.25 H 


 


Hgb   7.3 L 


 


Plt Count   365 


 


Absolute Seg Neuts  24.79 H  18.41 H 


 


Absolute Lymphocytes  1.36  0.61 L 


 


Creatinine   


 


AST   


 


ALT   


 


HIV 1&2 Antibody    NEGATIVE














  02/09/19 02/10/19 02/10/19





  06:25 06:38 06:38


 


WBC  14.51 H   13.36 H


 


Hgb  6.9 L   8.1 L


 


Plt Count  337   388


 


Absolute Seg Neuts  12.33 H  


 


Absolute Lymphocytes  1.60  


 


Creatinine   0.6 


 


AST   21 


 


ALT   35 


 


HIV 1&2 Antibody   














  02/11/19





  05:25


 


WBC 


 


Hgb 


 


Plt Count 


 


Absolute Seg Neuts 


 


Absolute Lymphocytes 


 


Creatinine  0.6


 


AST  20


 


ALT  31


 


HIV 1&2 Antibody 














- Physical Exam


General Appearance: no apparent distress, obese, non-toxic


EENT: No scleral icterus


Respiratory: lungs clear, normal breath sounds, No respiratory distress, No 

crackles, No wheezing


Neck: full range of motion, supple


Cardiac/Chest: regular rate, rhythm, systolic murmur, No bradycardia, No 

tachycardia, No diastolic murmur


Extremities: other (Swelling over the lateral aspect of her right wrist without 

overlying erythema, no tenderness to palpation, full range of motion in the 

right wrist)


Abdomen: normal bowel sounds, non-tender, soft, No distended, No guarding


Skin: No rash


Neuro/Psych: alert, normal mood/affect, oriented x 3, No confused





- Time Spent With Patient


Time Spent with Patient: greater than 35 minutes


Time Spent with Patient: Greater than 35 minutes spent on this patients care, 

greater than 50% of time spent counseling, educating, and coordinating care 

regarding the above mentioned plan.





ICD10 Worksheet


Patient Problems: 


 Problems











Problem Status Onset


 


Effusion, left knee Acute  


 


Hypokalemia Acute  


 


Hyponatremia Acute  


 


Leukocytosis Acute  


 


UTI (urinary tract infection) Acute  


 


Weakness Acute

## 2019-02-11 NOTE — PDANEPAE
ANE Past Medical History





- Cardiovascular History


Hx Hypertension: Yes


Hx Arrhythmias: No


Hx Chest Pain: No


Hx Coronary Artery / Peripheral Vascular Disease: No


Hx CHF / Valvular Disease: No


Hx Palpitations: No





- Pulmonary History


Hx COPD: No


Hx Asthma/Reactive Airway Disease: No


Hx Recent Upper Respiratory Infection: No


Hx Oxygen in Use at Home: No


Hx Sleep Apnea: No


Sleep Apnea Screening Result - Last Documented: Negative





- Endocrine History


Hx Diabetes: No


Hypothyroid: No





- Chronic Pain History


Chronic Pain: No





ANE Review of Systems


Review of Systems: 








ANE Patient History





- Allergies


Allergies/Adverse Reactions: 








No Known Allergies Allergy (Unverified 02/04/19 15:35)


 








- Home Medications


Home Medications: 








Hydrochlorothiazide [HCTZ (*)] 25 mg PO DAILY 01/24/19 [Last Taken Unknown]


Lisinopril [Zestril 10 mg (*)] 10 mg PO DAILY 02/04/19 [Last Taken 02/02/19]








- NPO status


NPO Since - Liquids (Date): 02/10/19


NPO Since - Liquids (Time): 09:30


NPO Since - Solids (Date): 02/10/19


NPO Since - Solids (Time): 09:20





- Smoking Hx


Smoking Status: Never smoked





- Alcohol Use


Alcohol Use: Occasionally





ANE Labs/Vital Signs





- Labs


Result Diagrams: 


 02/10/19 06:38





 02/11/19 05:25





- Vital Signs


Blood Pressure: 125/73


Heart Rate: 89


Respiratory Rate: 20


O2 Sat (%): 94


Height: 160.02 cm


Weight: 79.8 kg





ANE Physical Exam





- Airway


Neck exam: decreased ROM


Mallampati Score: Class 3


Mouth exam: poor dentition





- Pulmonary


Pulmonary: reduced air movement





- Cardiovascular


Cardiovascular: regular rate and rhythym





- ASA Status


ASA Status: III





ANE Anesthesia Plan


Total IV Anesthesia: Yes

## 2019-02-11 NOTE — NEUSURGPN
Date of Surgery: 02/06/19


Post Op Day: 5


Assessment/Plan: 


Assessment: 57 yo female s/p T4-5, T9-10, L4-5 laminectomy for washout of 

epidural abscess POD #5





Plan:


-Optimize pain management


-PT/OT, encouraged increased activity


-Follow ID recs, IV abx


-PASQUALE X 1-ok from Dr Tillman to remove the L4 drain


-will leave the decision to pull other drains to ID/IM


-SALAS on Monday


-Patient discussed/seen by Dr Tillman 


-Please call with any questions/concerns


Subjective: 


Awake and alert.  NAD.  Eating/drinking and voiding.  No f/c/n/v/d.  


Objective: 


Awake. Alert. PERRLA. EOMI


Following commands


LE strength diffusely 5/5


CDI


PASQUALE in place


Neuro Check Frequency: per routine


Urinary Catheter in Place: No





- Physician


Discussed Patient with Dr.: Tillman


Patient Seen by : Primo





Neurosurgery Physical Exam





- Vitals, I&O, Labs





 I and O











 02/10/19 02/11/19 02/12/19





 05:59 05:59 05:59


 


Intake Total 1400 2938 


 


Output Total 1180 1460 


 


Balance 220 1478 


 


Weight  79.8 kg 


 


Intake:   


 


  Oral (ml) 1400 820 


 


  IV Intake (ml)  900 


 


  IV Infused (ml)  1218 


 


    Nafcillin Sodium 2 gm In  300 





    D5w 100 ml @ 100 mls/hr   





    IV Q4HRS [facility] Rx#:   





    [id number]   


 


    Ns 1,000 ml @ 100 mls/hr  918 





    IV CONT [facility] Rx#:   





    [id number]   


 


Output:   


 


  Urine (ml) 1150 1425 


 


    Catheter 1150 1425 


 


  Estimated Blood Loss (ml)  10 


 


  PASQUALE Drain Output (ml) 30 25 


 


    #1 Posterior Back 30 25 


 


Other:   


 


  Intake Quantity  Yes 





  Sufficient   


 


  Number of Stools   


 


    Bedside Commode 1 0 








 Microbiology











 02/07/19 13:05 Gram Stain - Final





 Back - Aspirate 


 


 02/07/19 12:52 Gram Stain - Final





 Pelvis - Aspirate 








 Vital Signs











Temp Pulse Resp BP Pulse Ox


 


 36.9 C   90   18   124/77 H  92 


 


 02/11/19 04:00  02/11/19 04:00  02/11/19 04:00  02/11/19 04:00  02/11/19 04:00








 Laboratory Results





 02/10/19 06:38 





 02/11/19 05:25 











ICD10 Worksheet


Patient Problems: 


 Problems











Problem Status Onset


 


Effusion, left knee Acute  


 


Hypokalemia Acute  


 


Hyponatremia Acute  


 


Leukocytosis Acute  


 


UTI (urinary tract infection) Acute  


 


Weakness Acute

## 2019-02-11 NOTE — PDGENHP
History & Physical


Chief Complaint: MSSA bacteremia


History of Present Illness: 58-year-old female admitted with epidural abscess, 

olecranon bursitis, persistent MSSA bacteremia.  Requires SALAS to evaluate for 

bacterial endocarditis.


Pertinent Past, Social, Family History: Reviewed


Cardiorespiratory Assessment: Stable for sedation.  Sedation provided by the 

anesthesia service.

## 2019-02-12 RX ADMIN — OXYCODONE HYDROCHLORIDE PRN MG: 15 TABLET ORAL at 21:30

## 2019-02-12 RX ADMIN — NAFCILLIN SCH MLS: 2 INJECTION, POWDER, FOR SOLUTION INTRAMUSCULAR; INTRAVENOUS at 17:49

## 2019-02-12 RX ADMIN — DOCUSATE SODIUM AND SENNOSIDES SCH: 50; 8.6 TABLET ORAL at 08:15

## 2019-02-12 RX ADMIN — OXYCODONE HYDROCHLORIDE PRN MG: 15 TABLET ORAL at 09:30

## 2019-02-12 RX ADMIN — DOCUSATE SODIUM AND SENNOSIDES SCH TAB: 50; 8.6 TABLET ORAL at 20:05

## 2019-02-12 NOTE — ECHO
[email or web address]





[address] [address] 

Main: [phone] 



Fax: 



Transesophageal Echocardiography 

Name:            MIREILLE BETH                      MR#:

[id number]

Study Date:      2019                           Study Time:

10:22 AM

YOB: 1960                           Age:

58 year(s)

Height:            ( )                                Weight:

( )

BSA:                                                  Gender:

Female

Examination:     SALAS                                  Indication:

Eval for Vegetation

Image Quality:                                        Contrast: 

Requested by:     Serenity Bolaños  

Heart Rate:                                           Rhythm: 

BP:                 / 



Procedure Staff 

Ultrasound Technician:   Marco Antonio Griffiths RDCS 

Reading Physician:       Serenity Bolaños MD 

Requesting Provider: 



SALAS Exam Details 



Conclusions:          Normal global systolic LV function.  

An agitated saline study was performed and was negative for

intracardiac shunting.

No thrombus in left appendage.  

Mild mitral valve regurgitation is present.  

There is no mitral valve vegetation.  

The aortic valve is tri-leaflet and functions normally.  

There is no aortic valve vegetation.  

Mild to moderate tricuspid valve regurgitation.  

The pulmonary artery pressure is normal.  

No evidence of endocarditis 







Measurements: 

Chambers                   Valvular Assessment AV/MV          Valvular

Assessment TV/PV



Normal                                 Normal

Normal

Name         Value    Range             Name        Value Range

Name          Value Range



AV Vmax:    1.50 m/s (1 m/s-1.7       TR Vmax:      2.00 mm/s ( - )  

m/s)             TR PGmax:     16 mmHg ( - )  



AV maxP mmHg ( - )          syst. PAP: 21 mmHg ( - )  



Additional Measurements: 

Valvular Assessment TV/PV  



Name                   Value  

CVP (est.):            5 mmHg   



Patient: MIREILLE BETH                     MRN: [id number]

Study Date: 2019   Page 1 of 2

10:22 AM 









Findings: Left Ventricle: 

Normal global systolic LV function.  

Left Atrium: 

An agitated saline study was performed and was negative for

intracardiac shunting.

Left Atrial Appendage: 

Good color flow doppler in the left atrial appendage. No thrombus in

left appendage.

Mitral Valve: 

There is mild thickening of the mitral valve leaflets. Mild mitral

valve regurgitation is present. There is

no mitral valve vegetation.  

Aortic Valve: 

The aortic valve is tri-leaflet and functions normally. There is no

aortic valve regurgitation. There is

no aortic valve vegetation.  

Tricuspid Valve: 

The tricuspid valve is normal in appearance and function. Mild to

moderate tricuspid valve

regurgitation. The pulmonary artery pressure is normal.  

Pulmonic Valve: 

The pulmonic valve is normal in appearance and function.  

Aorta: 

The aorta is normal.  

Pericardium: 

No pericardial effusion. 



l1n 



Electronically signed by Serenity Bolaños MD on 2019 at 12:05 PM 

(No Signature Object) 



Patient: MIREILLE BETH                     MRN: [id number]

Study Date: 2019   Page 2 of 2

10:22 AM 







[id number]

## 2019-02-12 NOTE — HOSPPROG
Hospitalist Progress Note


Assessment/Plan: 





Ms Cortes is a patient with diffuse multiple MSSA abscess and bacteremia, very 

weak and poor food intake as she was stuck in bed at home for 2 weeks, s/p 

multiple procedures. 





#Acute sepsis with organ failure 


-resolved





#Anemia


-transfused


-recheck labs in a.m.





#MSSA bacteremia with multiple metastatic foci including multiple epidural 

abscesses, s/p lami and washout T4-5, T9-10,L4-5, bilateral iliopsoas abscesses 

and paraspinal abscess


-on going bacteremia (blood cx repeated today)


-drains placed  in for the psoas abscesses


-continuous Nafcillin


-rincon removed yesterday, PICC line placed w removal of IJ in case this was 

causing infection


-SALAS shows no evidence of endocarditis





#Olecranon bursitis


-I&D w Dr Hilton on 2/10


-elbow looks good, penrose in place, no rednesness





#hyperglycemia


-A1c is 6.2, for diagnosis of Diabetes it is a A1c >6.5, will ask dietary to 

see her to work w carbohydrate counting, etc


-family hx of diabetes





#Acute polyarticular inflammatory arthropathy with effusion in knee


-resolved





#Acute kidney injury


-resolved





#Hyponatremia


-dc saline


-checked na level yesterday and was 130


-suspect she has poor solute intake





#hypoalbuminemia


-dietary to see





#Acute metabolic encephalopathy due to all of above 


-resolved





#Hypokalemia


-resolved





#Pyuria 


-no c/o frequency, urgency





#Generalized weakness and deconditioning due to above


-PT/OT


-IP rehab evaluating





#Chronic back pain


-stable





#obesity w a BMI of 31





#Dispo


-pending





>35 minutes coordinating care, educating Chloe about the plan of care, 

reviewing her care w Dr Parmar. Will recheck labs in a.m. Urine studies are stable

, will lift a bit on her fluid restriction, recheck labs in a.m.





Subjective: Chloe is feeling better today overall.


Objective: 


 Vital Signs











Temp Pulse Resp BP Pulse Ox


 


 36.9 C   84   27 H  153/99 H  100 


 


 02/12/19 07:36  02/12/19 07:36  02/12/19 07:36  02/12/19 07:36  02/12/19 07:36








 Microbiology











 02/10/19 14:35 Gram Stain - Final





 Elbow - Eswab 


 


 02/07/19 13:05 Gram Stain - Final





 Back - Aspirate 


 


 02/07/19 12:52 Gram Stain - Final





 Pelvis - Aspirate 


 


 02/06/19 14:46 Gram Stain - Final





 Back - Eswab 


 


 02/06/19 14:46 Gram Stain - Final





 Chest - Eswab 


 


 02/04/19 18:23 Gram Stain - Final





 Knee - Aspirate Body Fluid Culture - Final








 Laboratory Results





 02/10/19 06:38 





 02/12/19 06:30 





 











 02/11/19 02/12/19 02/13/19





 05:59 05:59 05:59


 


Intake Total 2938 500 


 


Output Total 1460 1060 500


 


Balance 1478 -560 -500








 











PT  16.6 SEC (12.0-15.0)  H  02/07/19  05:20    


 


INR  1.32  (0.83-1.16)  H  02/07/19  05:20    














- Physical Exam


Constitutional: no apparent distress, appears nourished, chronically ill 

appearing


Eyes: PERRL


Ears, Nose, Mouth, Throat: hearing normal


Cardiovascular: regular rate and rhythym


Respiratory: no respiratory distress


Skin: warm, other (back incisions without drainage, no redness, non tender, 

steri strips intact. Left elbow w out redness, no drainage from penrose. Has a 

drain coming from left lower back area with brownish purulent drainage, drain 

from r side of back area w serous drainage.)


Musculoskeletal: generalized weakness


Neurologic: AAOx3


Psychiatric: interacting appropriately





ICD10 Worksheet


Patient Problems: 


 Problems











Problem Status Onset


 


Effusion, left knee Acute  


 


Hypokalemia Acute  


 


Hyponatremia Acute  


 


Leukocytosis Acute  


 


UTI (urinary tract infection) Acute  


 


Weakness Acute

## 2019-02-12 NOTE — NEUSURGPN
Date of Surgery: 02/06/19


Post Op Day: 6


Assessment/Plan: 


Assessment: 59 yo female s/p T4-5, T9-10, L4-5 laminectomy for washout of 

epidural abscess POD #6





Plan:


-Optimize pain management


-PT/OT, encouraged increased activity


-Follow ID recs, IV abx


-Surgical drains have been removed, will leave the decision to pull other 

drains to ID/IM


-Neurosurgery will sign off, please call us with questions/concerns


-We will have patient follow up for post op visit in 2-3 weeks  


-Patient discussed/seen by Dr Tillman 





Subjective: 


Sleeping in bed, denies leg pain 


Objective: 


Awakens to voice. Alert. PERRLA. EOMI


Following commands


LE strength diffusely 5/5


CDI


PASQUALE in place


Neuro Check Frequency: per routine 


Urinary Catheter in Place: No





- Physician


Discussed Patient with : Primo





Neurosurgery Physical Exam





- Vitals, I&O, Labs





 I and O











 02/11/19 02/12/19 02/13/19





 05:59 05:59 05:59


 


Intake Total 2938 500 


 


Output Total 1460 1060 


 


Balance 1478 -560 


 


Weight 79.8 kg 79.8 kg 


 


Intake:   


 


  Oral (ml) 820 500 


 


  IV Intake (ml) 900  


 


  IV Infused (ml) 1218  


 


    Nafcillin Sodium 2 gm In 300  





    D5w 100 ml @ 100 mls/hr   





    IV Q4HRS [facility] Rx#:   





    [id number]   


 


    Ns 1,000 ml @ 100 mls/hr 918  





    IV CONT [facility] Rx#:   





    [id number]   


 


Output:   


 


  Urine (ml) 1425 1050 


 


    Bedside Commode  350 


 


    Catheter 1425 700 


 


  Estimated Blood Loss (ml) 10  


 


  PASQUALE Drain Output (ml) 25 10 


 


    #1 Posterior Back 25 10 


 


Other:   


 


  Intake Quantity Yes  





  Sufficient   


 


  Number of Voids   


 


    Bedside Commode  1 


 


  Number of Stools   


 


    Bedside Commode 0 1 








 Microbiology











 02/10/19 14:35 Gram Stain - Final





 Elbow - Eswab 


 


 02/07/19 13:05 Gram Stain - Final





 Back - Aspirate 


 


 02/07/19 12:52 Gram Stain - Final





 Pelvis - Aspirate 


 


 02/06/19 14:46 Gram Stain - Final





 Back - Eswab 


 


 02/06/19 14:46 Gram Stain - Final





 Chest - Eswab 


 


 02/04/19 18:23 Gram Stain - Final





 Knee - Aspirate Body Fluid Culture - Final








 Vital Signs











Temp Pulse Resp BP Pulse Ox


 


 36.7 C   87   24 H  160/82 H  92 


 


 02/12/19 04:00  02/12/19 04:00  02/12/19 04:00  02/12/19 04:00  02/12/19 04:00








 Laboratory Results





 02/10/19 06:38 





 02/12/19 06:30 











ICD10 Worksheet


Patient Problems: 


 Problems











Problem Status Onset


 


Effusion, left knee Acute  


 


Hypokalemia Acute  


 


Hyponatremia Acute  


 


Leukocytosis Acute  


 


UTI (urinary tract infection) Acute  


 


Weakness Acute

## 2019-02-12 NOTE — ASMTCMCOM
CM Note

 

CM Note                       

Notes:

Pts case discussed w/ Venecia Villanueva NP. CM met w/ pt for dispo planning. PT as of 2/11 is 

recommending inpatient rehab. CM has been in touch w/ inpatient rehab and they are considering 

her. The earliest she can d/c is early next week. Pt is agreeable to SNF referrals as a secondary 

plan. CM started ultc-100 and submitted it to [facility]. CM to follow.







Plan: TBD

 

Date Signed:  02/12/2019 11:07 AM

Electronically Signed By:PIEDAD Toure

## 2019-02-13 LAB — PLATELET # BLD: 318 10^3/UL (ref 150–400)

## 2019-02-13 RX ADMIN — DOCUSATE SODIUM AND SENNOSIDES SCH: 50; 8.6 TABLET ORAL at 11:43

## 2019-02-13 RX ADMIN — NAFCILLIN SCH MLS: 2 INJECTION, POWDER, FOR SOLUTION INTRAMUSCULAR; INTRAVENOUS at 18:13

## 2019-02-13 RX ADMIN — LISINOPRIL SCH MG: 10 TABLET ORAL at 11:37

## 2019-02-13 RX ADMIN — DOCUSATE SODIUM AND SENNOSIDES SCH TAB: 50; 8.6 TABLET ORAL at 21:01

## 2019-02-13 RX ADMIN — OXYCODONE HYDROCHLORIDE PRN MG: 15 TABLET ORAL at 21:01

## 2019-02-13 RX ADMIN — OXYCODONE HYDROCHLORIDE PRN MG: 15 TABLET ORAL at 08:10

## 2019-02-13 NOTE — SOAPPROG
SOAP Progress Note


Assessment/Plan: 


Assessment: Caitlin is s/p left elbow I&D. She reports she is doing well 

without elbow pain





PE: Drain fell out earlier today. Incision is well approximated without erythema

, warmth, or drainage. ROM of the elbow is close to fall and is without pain. 








Plan: Dressing placed. Keep working on elbow ROM. Plan on suture removal 14 

days postop 





02/13/19 20:50





Objective: 





 Vital Signs











Temp Pulse Resp BP Pulse Ox


 


 37.0 C   97   18   146/76 H  94 


 


 02/13/19 19:21  02/13/19 19:21  02/13/19 19:21  02/13/19 19:21  02/13/19 19:21








 Microbiology











 02/10/19 14:35 Gram Stain - Final





 Elbow - Eswab 


 


 02/07/19 13:05 Gram Stain - Final





 Back - Aspirate 


 


 02/07/19 12:52 Gram Stain - Final





 Pelvis - Aspirate 


 


 02/06/19 14:46 Gram Stain - Final





 Back - Eswab 


 


 02/06/19 14:46 Gram Stain - Final





 Chest - Eswab Anaerobic Culture - Final





    Staphylococcus Aureus


 


 02/04/19 18:23 Gram Stain - Final





 Knee - Aspirate Body Fluid Culture - Final


 


 02/08/19 04:45 Blood Culture - Final





 Blood    Staphylococcus Aureus


 


 02/08/19 04:30 Blood Culture - Final





 Blood    Staphylococcus Aureus








 Laboratory Results





 02/13/19 04:40 





 02/13/19 18:05 





 











 02/12/19 02/13/19 02/14/19





 05:59 05:59 05:59


 


Intake Total 500 2358 1680


 


Output Total 1060 2275 1900


 


Balance -560 83 -220








 











PT  16.6 SEC (12.0-15.0)  H  02/07/19  05:20    


 


INR  1.32  (0.83-1.16)  H  02/07/19  05:20    














ICD10 Worksheet


Patient Problems: 


 Problems











Problem Status Onset


 


Effusion, left knee Acute  


 


Hypokalemia Acute  


 


Hyponatremia Acute  


 


Leukocytosis Acute  


 


UTI (urinary tract infection) Acute  


 


Weakness Acute

## 2019-02-13 NOTE — CPEKG
Test Reason : OPEN

Blood Pressure : ***/*** mmHG

Vent. Rate : 087 BPM     Atrial Rate : 087 BPM

   P-R Int : 152 ms          QRS Dur : 097 ms

    QT Int : 386 ms       P-R-T Axes : 050 051 044 degrees

   QTc Int : 465 ms

 

Sinus rhythm

 

Confirmed by Clayton Adams ([id number]) on 2/13/2019 11:49:29 AM

 

Referred By: Jovanny Barnes           Confirmed By:Clayton Adams

## 2019-02-13 NOTE — PCMIDPN
Assessment/Plan: 


Assessment:  58-year-old woman with complicated methicillin-susceptible Staph 

aureus bloodstream infection complicated by multilevel epidural abscess left 

elbow septic bursitis, and bilateral iliopsoas abscesses.  Overall improving 

and tolerating nafcillin well, blood cultures taken on 02/11 remain negative at 

greater than 36 hr of incubation which were obtained after the right IJ CVC was 

removed.  If these blood cultures from 11th remain negative definitive PICC 

line for therapy can be placed on the left ideally, with removal of the right 

side as this was placed with uncertainty of any residual bacteremia.





1. Complicated methicillin-susceptible Staph aureus bloodstream infection, 

initial source unclear; improving


2. Multilevel epidural abscess with MSSA, status post laminectomy/washout at T4-

5, T10, L4-L5 on 2/6/2019


3. Status post iliopsoas and gluteal abscess drainage 2/7/2019 with IR


4. Status post incision and drainage left olecranon bursitis, 2/10/2019


5. Status post SALAS, 2/11/2019; negative for valvular vegetation





Plan:


1. Continue nafcillin 12 g Q 24 hr by continuous infusion


2. Will have to wait at least 72 hr from 1st negative blood cultures to replace 

definitive PICC line; This likely would be done on Friday if blood cultures 

from the 11th remain negative


3. Discussed in detail potential side effects of nafcillin which include rash, 

antibiotic associated diarrhea, C diff colitis, phlebitis 





Simone Tran MD


Infectious Diseases





02/13/19 10:18





Subjective: 


No fever or chills.  No new joint pains or swelling.  Getting out of bed and 

sitting up more frequently in for longer durations and tolerating it well.  The 

lateral aspect of right wrist with small area of swelling with no limitation 

and wrist movement or pain.





Objective: 


 Vital Signs











Temp Pulse Resp BP Pulse Ox


 


 36.6 C   93   20   181/89 H  100 


 


 02/13/19 08:30  02/13/19 08:30  02/13/19 08:30  02/13/19 08:30  02/13/19 08:30








 Microbiology











 02/08/19 04:45 Blood Culture - Final





 Blood    Staphylococcus Aureus


 


 02/08/19 04:30 Blood Culture - Final





 Blood    Staphylococcus Aureus


 


 02/10/19 14:35 Gram Stain - Final





 Elbow - Eswab 


 


 02/07/19 13:05 Gram Stain - Final





 Back - Aspirate 


 


 02/07/19 12:52 Gram Stain - Final





 Pelvis - Aspirate 


 


 02/06/19 14:46 Gram Stain - Final





 Back - Eswab 


 


 02/06/19 14:46 Gram Stain - Final





 Chest - Eswab 


 


 02/04/19 18:23 Gram Stain - Final





 Knee - Aspirate Body Fluid Culture - Final


 


 02/07/19 13:05 Mycobacterial Smear (SHAAN) - Final





 Back - Aspirate 


 


 02/07/19 12:52 Mycobacterial Smear (SHAAN) - Final





 Pelvis - Aspirate 








 Laboratory Results





 02/13/19 04:40 





 02/13/19 04:40 





 











 02/12/19 02/13/19 02/14/19





 05:59 05:59 05:59


 


Intake Total 500 2358 320


 


Output Total 1060 2275 1000


 


Balance -560 83 -680








 











ESR  72 MM/HR (0-30)  H  02/05/19  04:58    


 


C-Reactive Protein  204.9 mg/L (<10.0)  H  02/05/19  04:58    








Medications











Generic Name Dose Route Start Last Admin





  Trade Name Freq  PRN Reason Stop Dose Admin


 


Nafcillin Sodium 12 gm/  1,000 mls @ 41.667 mls/hr  02/11/19 18:00  02/12/19 17:

49





  Dextrose  IV  03/13/19 17:59  1,000 mls





  CONT@1800 [facility]   














Discontinued Medications














Generic Name Dose Route Start Last Admin





  Trade Name Freq  PRN Reason Stop Dose Admin


 


Nafcillin Sodium 2 gm/  100 mls @ 100 mls/hr  02/05/19 22:00  02/11/19 14:21





  Dextrose  IV  03/07/19 21:59  100 mls





  Q4HRS [facility]   





  Protocol   








Microbiology





02/10/19 14:35   Elbow - Eswab   Gram Stain - Final


02/08/19 04:45   Blood   Blood Culture - Final


                              Staphylococcus Aureus


02/08/19 04:30   Blood   Blood Culture - Final


                              Staphylococcus Aureus


02/07/19 13:05   Back - Aspirate   Gram Stain - Final


02/07/19 12:52   Pelvis - Aspirate   Gram Stain - Final


02/04/19 18:25   Blood   Blood Culture - Final


                              Staphylococcus Aureus


02/04/19 18:25   Blood   Blood Culture - Final


02/04/19 18:25   Blood   Blood Panel (PCR) - Final


                              Staphylococcus Aureus


                              S.aureus Methicillin Suscept.


02/04/19 18:06   Urine,Catheterized   Urine Culture - Final


                              Staphylococcus Aureus


02/11/19 17:00   Blood   Blood Culture - Preliminary


02/11/19 16:23   Blood   Blood Culture - Preliminary


02/10/19 14:35   Elbow - Eswab   Anaerobic Culture - Preliminary


                              Staphylococcus Aureus


02/10/19 10:25   Blood   Blood Culture - Preliminary


                              Staphylococcus Aureus


02/07/19 13:05   Back - Aspirate   Anaerobic Culture - Preliminary


                              Staphylococcus Aureus


02/07/19 12:52   Pelvis - Aspirate   Anaerobic Culture - Preliminary


                              Staphylococcus Aureus





 Laboratory Tests











  02/10/19 02/11/19 02/13/19





  06:38 05:25 04:40


 


WBC  13.36 H  


 


Hgb  8.1 L  


 


Plt Count  388  


 


Creatinine    0.6


 


AST   20 


 


ALT   31 














  02/13/19





  04:40


 


WBC  9.39


 


Hgb  7.6 L


 


Plt Count  318


 


Creatinine 


 


AST 


 


ALT 














- Physical Exam


General Appearance: no apparent distress, obese, non-toxic


EENT: No scleral icterus


Respiratory: lungs clear, normal breath sounds, No respiratory distress, No 

crackles, No wheezing


Neck: full range of motion, supple


Cardiac/Chest: regular rate, rhythm, systolic murmur, No bradycardia, No 

tachycardia, No diastolic murmur


Extremities: other (Lateral aspect of right wrist with approximately 1 cm 

diameter area of soft tissue swelling with no overlying erythema, no tenderness 

to palpation), No swelling, No erythema (Left elbow dressing not taken down, 

dressings over spinal incisions not taken down)


Abdomen: non-tender, soft, No distended, No guarding


Skin: No rash


Neuro/Psych: normal mood/affect, oriented x 3, No confused





- Time Spent With Patient


Time Spent with Patient: greater than 25 minutes


Time Spent with Patient: Greater than 25 minutes spent on this patients care, 

greater than 50% of time spent counseling, educating, and coordinating care 

regarding the above mentioned plan.





ICD10 Worksheet


Patient Problems: 


 Problems











Problem Status Onset


 


Effusion, left knee Acute  


 


Hypokalemia Acute  


 


Hyponatremia Acute  


 


Leukocytosis Acute  


 


UTI (urinary tract infection) Acute  


 


Weakness Acute

## 2019-02-13 NOTE — HOSPPROG
Hospitalist Progress Note


Assessment/Plan: 





Ms Cortes is a patient with diffuse multiple MSSA abscess and bacteremia, very 

weak and poor food intake as she was stuck in bed at home for 2 weeks, s/p 

multiple procedures. 





#MSSA bacteremia with multiple metastatic foci including multiple epidural 

abscesses, s/p lami and washout T4-5, T9-10,L4-5, bilateral iliopsoas abscesses 

and paraspinal abscess


-blood cx repeated yesterday show no growth


-drains placed  in for the psoas abscesses


-continuous Nafcillin


-SALAS shows no evidence of endocarditis





#Acute sepsis with organ failure 


-resolved





#Anemia


-transfused


-hgb and hct lower today





#Olecranon bursitis


-I&D w Dr Hilton on 2/10


-elbow looks good (minimal redness) Penrose fell out





#hyperglycemia


-A1c is 6.2, for diagnosis of Diabetes it is a A1c >6.5, appreciate dietary 

seeing her


-family hx of diabetes





#Acute polyarticular inflammatory arthropathy with effusion in knee


-resolved





#Acute kidney injury


-resolved





#Hyponatremia


-dc saline


-sodium level has improved, lifted her fluid restriction to 1800 ml/day





#hypoalbuminemia


-eating better today





#Acute metabolic encephalopathy due to all of above 


-resolved





#Hypokalemia


-resolved





#Pyuria 


-no c/o frequency, urgency





#Generalized weakness and deconditioning due to above


-PT/OT


-IP rehab evaluating





#Chronic back pain


-stable





#obesity w a BMI of 31





#dvt prophylaxis: LMWH has been on hold due to blood loss, will follow closely 

and resume when stable. She has athrombic pumps on, ambulating more. Was able 

to walk down the hallway today. 








#Plan: if blood cx remain negative; will have current PICC line removed w a new 

one placed per ID. Her hgb/hct have dropped, she is asymptomatic of this.  Will 

follow. DC telemetry monitoring, she has been in sinus.  


Subjective: Chloe is feeling better daily. Very motivated w her diet and eating 

well.


Objective: 


 Vital Signs











Temp Pulse Resp BP Pulse Ox


 


 36.9 C   89   20   160/78 H  95 


 


 02/13/19 12:04  02/13/19 12:04  02/13/19 12:04  02/13/19 12:04  02/13/19 12:04








 Microbiology











 02/10/19 14:35 Gram Stain - Final





 Elbow - Eswab 


 


 02/07/19 13:05 Gram Stain - Final





 Back - Aspirate 


 


 02/07/19 12:52 Gram Stain - Final





 Pelvis - Aspirate 


 


 02/06/19 14:46 Gram Stain - Final





 Back - Eswab 


 


 02/06/19 14:46 Gram Stain - Final





 Chest - Eswab Anaerobic Culture - Final





    Staphylococcus Aureus


 


 02/04/19 18:23 Gram Stain - Final





 Knee - Aspirate Body Fluid Culture - Final


 


 02/08/19 04:45 Blood Culture - Final





 Blood    Staphylococcus Aureus


 


 02/08/19 04:30 Blood Culture - Final





 Blood    Staphylococcus Aureus


 


 02/07/19 13:05 Mycobacterial Smear (SHAAN) - Final





 Back - Aspirate 


 


 02/07/19 12:52 Mycobacterial Smear (SHAAN) - Final





 Pelvis - Aspirate 








 Laboratory Results





 02/13/19 04:40 





 02/13/19 04:40 





 











 02/12/19 02/13/19 02/14/19





 05:59 05:59 05:59


 


Intake Total 500 2358 880


 


Output Total 1060 2275 1200


 


Balance -560 83 -320








 











PT  16.6 SEC (12.0-15.0)  H  02/07/19  05:20    


 


INR  1.32  (0.83-1.16)  H  02/07/19  05:20    














- Physical Exam


Constitutional: appears nourished, not in pain, obese


Eyes: PERRL


Ears, Nose, Mouth, Throat: hearing normal


Cardiovascular: regular rate and rhythym, edema (bilateral ankle, generalized 

body edema)


Respiratory: no respiratory distress, reduced air movement


Gastrointestinal: normoactive bowel sounds


Skin: warm, other (left elbow w minimal redness, has no pain w flexion and 

extension, stitches in place, well approximated)


Musculoskeletal: generalized weakness


Neurologic: AAOx3


Psychiatric: interacting appropriately





ICD10 Worksheet


Patient Problems: 


 Problems











Problem Status Onset


 


Effusion, left knee Acute  


 


Hypokalemia Acute  


 


Hyponatremia Acute  


 


Leukocytosis Acute  


 


UTI (urinary tract infection) Acute  


 


Weakness Acute

## 2019-02-14 RX ADMIN — NAFCILLIN SCH MLS: 2 INJECTION, POWDER, FOR SOLUTION INTRAMUSCULAR; INTRAVENOUS at 18:35

## 2019-02-14 RX ADMIN — LISINOPRIL SCH MG: 10 TABLET ORAL at 09:44

## 2019-02-14 RX ADMIN — DOCUSATE SODIUM AND SENNOSIDES SCH: 50; 8.6 TABLET ORAL at 10:04

## 2019-02-14 RX ADMIN — OXYCODONE HYDROCHLORIDE PRN MG: 15 TABLET ORAL at 09:45

## 2019-02-14 RX ADMIN — OXYCODONE HYDROCHLORIDE PRN MG: 15 TABLET ORAL at 20:13

## 2019-02-14 RX ADMIN — DOCUSATE SODIUM AND SENNOSIDES SCH TAB: 50; 8.6 TABLET ORAL at 20:13

## 2019-02-14 NOTE — ASMTCMCOM
CM Note

 

CM Note                       

Notes:

Pts case discussed in tx rounds. CM spoke to Mehreen Romero at inpatient rehab. Mehreen initially 

declined her because she is "too good". [facility], [facility], and [facility] have all said 

no. Rafal from [facility] stopped by and met w/ pt. [facility] will most likely decline because pt is 


58 and will probably not qualify for disability. CM met w/ pt to discuss secondary plan. Pt reports 


that she is unable to get up stairs to her second floor apartment. CM made a referral to [facility]. [facility] 


is able to accept. CM spoke to Oma and she has already worked w/ her today. Roseanncurly will pass 

on in report to work on stairs tomorrow. Drains possibly can come out tomorrow as well. CM spoke to 


Mehreen again and informed her that pt is unable to do stairs. [facility] will continue to follow but 

thinks that [facility] will not [facility] CM to follow.







Plan: TBD

 

Date Signed:  02/14/2019 12:36 PM

Electronically Signed By:PIEDAD Toure

## 2019-02-14 NOTE — PCMIDPN
Assessment/Plan: 


Assessment:  58-year-old woman with complicated methicillin-susceptible Staph 

aureus bloodstream infection complicated by multilevel epidural abscess left 

elbow septic bursitis, and bilateral iliopsoas abscesses.  Blood cultures from 

02/11 remain negative, suggesting we may have cleared her bloodstream.  There 

are no new bone or joint sites suggesting further metastatic foci that are 

coming to clinical attention.  Will plan to remove right PICC line that was 

placed presumptively was still bacteremic with the right IJ CVC still in place 

and put a left-sided PICC line the in tomorrow as her definitive access for 

ongoing IV therapy.





1. Complicated methicillin-susceptible Staph aureus bloodstream infection, 

initial source unclear; improving


2. Multilevel epidural abscess with MSSA, status post laminectomy/washout at T4-

5, T10, L4-L5 on 2/6/2019


3. Status post iliopsoas and gluteal abscess drainage 2/7/2019 with IR


4. Status post incision and drainage left olecranon bursitis, 2/10/2019


5. Status post SALAS, 2/11/2019; negative for valvular vegetation





Plan:


1. Continue nafcillin 12 g Q 24 hr by continuous infusion


2. Blood cultures from the 11th remain negative, we will switch right-sided 

PICC line tomorrow to left-sided PICC line which will be her definitive access 

for IV therapy to treat this infection


3. Plan to discuss with IR today removal of drains accessing the gluteal 

abscess site and the iliopsoas abscess site 


4. Had rifampin susceptibilities added to her most recent Staph aureus isolates 

to potentially at is an adjunct going forward 


5. Discussed in detail potential side effects of nafcillin which include rash, 

antibiotic associated diarrhea, C diff colitis, phlebitis 





Simone Tran MD


Infectious Diseases





02/14/19 10:32





Subjective: 


No fever or chills.  No new joint pains and no skin rashes.  No diarrhea or 

abdominal pain.  Overall she is feeling generally improved and increasing her 

amount of time sitting up in a chair and ambulating.  No trouble with the right-

sided PICC line.





Objective: 


 Vital Signs











Temp Pulse Resp BP Pulse Ox


 


 36.8 C   85   18   164/91 H  100 


 


 02/14/19 07:44  02/14/19 07:44  02/14/19 07:44  02/14/19 07:44  02/14/19 07:44








 Microbiology











 02/08/19 04:30 Blood Culture - Final





 Blood    Staphylococcus Aureus


 


 02/10/19 14:35 Gram Stain - Final





 Elbow - Eswab 


 


 02/07/19 13:05 Gram Stain - Final





 Back - Aspirate 


 


 02/07/19 12:52 Gram Stain - Final





 Pelvis - Aspirate 


 


 02/06/19 14:46 Gram Stain - Final





 Back - Eswab 


 


 02/06/19 14:46 Gram Stain - Final





 Chest - Eswab Anaerobic Culture - Final





    Staphylococcus Aureus


 


 02/04/19 18:23 Gram Stain - Final





 Knee - Aspirate Body Fluid Culture - Final


 


 02/08/19 04:45 Blood Culture - Final





 Blood    Staphylococcus Aureus








 Laboratory Results





 02/14/19 05:30 





 02/14/19 05:30 





 











 02/13/19 02/14/19 02/15/19





 05:59 05:59 05:59


 


Intake Total 2358 1680 


 


Output Total 2275 3400 500


 


Balance 83 -1720 -500








 











ESR  72 MM/HR (0-30)  H  02/05/19  04:58    


 


C-Reactive Protein  204.9 mg/L (<10.0)  H  02/05/19  04:58    








Medications











Generic Name Dose Route Start Last Admin





  Trade Name Freq  PRN Reason Stop Dose Admin


 


Nafcillin Sodium 12 gm/  1,000 mls @ 41.667 mls/hr  02/11/19 18:00  02/13/19 18:

13





  Dextrose  IV  03/13/19 17:59  1,000 mls





  CONT@1800 [facility]GE[facility]A   














Discontinued Medications














Generic Name Dose Route Start Last Admin





  Trade Name Freq  PRN Reason Stop Dose Admin


 


Cefazolin Sodium  Confirm  02/06/19 13:55  02/06/19 13:02





  Ancef Syringe  Administered  02/06/19 13:56  1 gm





  Dose   





  1 gm   





  .ROUTE   





  .STK-MED ONE   


 


Cefazolin Sodium  Confirm  02/06/19 15:04  02/06/19 15:09





  Ancef Syringe  Administered  02/06/19 15:05  1 gm





  Dose   





  1 gm   





  .ROUTE   





  .STK-MED ONE   


 


Cefazolin Sodium/Dextrose  100 mls @ 200 mls/hr  02/05/19 14:00  02/05/19 21:37





  Ancef  IV  03/07/19 13:59  100 mls





  Q8HRS [facility]   





  Protocol   


 


Cefazolin Sodium/Dextrose  100 mls @ 200 mls/hr  02/06/19 08:29  02/06/19 14:59





  Ancef  IV  02/06/19 08:58  Not Given





  ONCALL ONE   





  Protocol   


 


Nafcillin Sodium 2 gm/  100 mls @ 100 mls/hr  02/05/19 22:00  02/11/19 14:21





  Dextrose  IV  03/07/19 21:59  100 mls





  Q4HRS [facility]   





  Protocol   


 


Vancomycin HCl 1.25 gm/  250 mls @ 166.67 mls/hr  02/05/19 08:35  02/05/19 09:13





  Dextrose  IV  02/05/19 10:04  250 mls





  Q12H ONE   





  Protocol   


 


Vancomycin HCl  Confirm  02/06/19 15:49  02/06/19 15:53





  Vancomycin Hcl  Administered  02/06/19 15:50  500 mg





  Dose   





  500 mg   





  IV   





  .[facility]-MED ONE   








Microbiology





02/10/19 14:35   Elbow - Eswab   Gram Stain - Final


02/08/19 04:45   Blood   Blood Culture - Final


                              Staphylococcus Aureus


02/11/19 17:00   Blood   Blood Culture - Preliminary


02/11/19 16:23   Blood   Blood Culture - Preliminary


02/10/19 14:35   Elbow - Eswab   Anaerobic Culture - Preliminary


                              Staphylococcus Aureus


02/10/19 10:25   Blood   Blood Culture - Preliminary


                              Staphylococcus Aureus





 Laboratory Tests











  02/09/19 02/10/19 02/13/19





  06:25 06:38 04:40


 


WBC   13.36 H 


 


Hgb   8.1 L 


 


Plt Count  337  388 


 


Creatinine    0.6














  02/13/19 02/14/19





  04:40 05:30


 


WBC  9.39 


 


Hgb  7.6 L  7.3 L


 


Plt Count  318 


 


Creatinine  














- Physical Exam


General Appearance: no apparent distress, obese, non-toxic


EENT: No scleral icterus, No thrush


Respiratory: lungs clear, normal breath sounds, No respiratory distress, No 

crackles, No wheezing


Neck: full range of motion, supple


Cardiac/Chest: regular rate, rhythm, systolic murmur, No bradycardia, No 

tachycardia, No diastolic murmur


Extremities: other (Left elbow incision site with sutures in place, minimal 

surrounding erythema, no fluctuance or induration), No erythema


Abdomen: normal bowel sounds, non-tender, soft, No distended, No guarding


Back: other (Spinal incisions over the upper thoracic mid thoracic and lumbar 

spine with Steri-Strips in place, no surrounding erythema, minimal tenderness 

to palpation, no induration, no fluctuance)


Skin: No rash


Neuro/Psych: alert, normal mood/affect, oriented x 3, No confused





- Time Spent With Patient


Time Spent with Patient: greater than 35 minutes


Time Spent with Patient: Greater than 35 minutes spent on this patients care, 

greater than 50% of time spent counseling, educating, and coordinating care 

regarding the above mentioned plan.





ICD10 Worksheet


Patient Problems: 


 Problems











Problem Status Onset


 


Effusion, left knee Acute  


 


Hypokalemia Acute  


 


Hyponatremia Acute  


 


Leukocytosis Acute  


 


UTI (urinary tract infection) Acute  


 


Weakness Acute

## 2019-02-14 NOTE — HOSPPROG
Hospitalist Progress Note


Assessment/Plan: 





Ms Cortes is a patient with diffuse multiple MSSA abscess and bacteremia, very 

weak and poor food intake as she was stuck in bed at home for 2 weeks, s/p 

multiple procedures. 





#MSSA bacteremia with multiple metastatic foci including multiple epidural 

abscesses, s/p lami and washout T4-5, T9-10,L4-5, bilateral iliopsoas abscesses 

and paraspinal abscess


-blood cx repeated show no growth


-drains placed  in for the psoas abscesses


-continuous Nafcillin


-SALAS shows no evidence of endocarditis





#Acute sepsis with organ failure 


-resolved





#Anemia


-transfused


-hgb and hct lower today





#Olecranon bursitis


-I&D w Dr Hilton on 2/10


-elbow looks good (minimal redness) Penrose fell out





#hyperglycemia


-A1c is 6.2, for diagnosis of Diabetes it is a A1c >6.5, appreciate dietary 

seeing her


-family hx of diabetes





#Acute polyarticular inflammatory arthropathy with effusion in knee


-resolved





#Acute kidney injury


-resolved





#Hyponatremia


-dc saline


-sodium level has improved, lifted her fluid restriction to 1800 ml/day





#hypoalbuminemia


-eating better today





#Acute metabolic encephalopathy due to all of above 


-resolved





#Hypokalemia


-resolved





#Pyuria 


-no c/o frequency, urgency





#Generalized weakness and deconditioning due to above


-PT/OT


-IP rehab evaluating





#Chronic back pain


-stable





#obesity w a BMI of 31





#dvt prophylaxis: LMWH has been on hold due to blood loss, will follow closely 

and resume when stable. She has athrombic pumps on, ambulating more. Was able 

to walk down the hallway today. 








#Plan:give dose of lasix IV x one now, generalized edema and high bp. Picc to 

be changed out tomorrow, IR to do an abscess check and if possible, pull the 

drains.  Reviewed her care w CM and she doesn't qualify for a SNF.  Will see if 

she can do stairs w PT and will arrange home care. 


Subjective: Chloe has an upset stomach this morning she attributes to eating 

jello during the night.


Objective: 


 Vital Signs











Temp Pulse Resp BP Pulse Ox


 


 36.8 C   85   18   164/91 H  100 


 


 02/14/19 07:44  02/14/19 07:44  02/14/19 07:44  02/14/19 07:44  02/14/19 07:44








 Microbiology











 02/08/19 04:30 Blood Culture - Final





 Blood    Staphylococcus Aureus


 


 02/10/19 14:35 Gram Stain - Final





 Elbow - Eswab 


 


 02/07/19 13:05 Gram Stain - Final





 Back - Aspirate 


 


 02/07/19 12:52 Gram Stain - Final





 Pelvis - Aspirate 


 


 02/06/19 14:46 Gram Stain - Final





 Back - Eswab 


 


 02/06/19 14:46 Gram Stain - Final





 Chest - Eswab Anaerobic Culture - Final





    Staphylococcus Aureus


 


 02/04/19 18:23 Gram Stain - Final





 Knee - Aspirate Body Fluid Culture - Final


 


 02/08/19 04:45 Blood Culture - Final





 Blood    Staphylococcus Aureus








 Laboratory Results





 02/14/19 05:30 





 02/14/19 05:30 





 











 02/13/19 02/14/19 02/15/19





 05:59 05:59 05:59


 


Intake Total 2358 1680 


 


Output Total 2275 3400 500


 


Balance 83 -1720 -500








 











PT  16.6 SEC (12.0-15.0)  H  02/07/19  05:20    


 


INR  1.32  (0.83-1.16)  H  02/07/19  05:20    














- Physical Exam


Constitutional: appears nourished, not in pain, obese, uncomfortable


Eyes: PERRL


Ears, Nose, Mouth, Throat: hearing normal


Cardiovascular: regular rate and rhythym


Respiratory: no respiratory distress


Gastrointestinal: normoactive bowel sounds, soft, non-tender abdomen


Skin: warm


Musculoskeletal: generalized weakness


Neurologic: AAOx3


Psychiatric: interacting appropriately





ICD10 Worksheet


Patient Problems: 


 Problems











Problem Status Onset


 


Effusion, left knee Acute  


 


Hypokalemia Acute  


 


Hyponatremia Acute  


 


Leukocytosis Acute  


 


UTI (urinary tract infection) Acute  


 


Weakness Acute

## 2019-02-15 LAB — PLATELET # BLD: 252 10^3/UL (ref 150–400)

## 2019-02-15 PROCEDURE — 02H633Z INSERTION OF INFUSION DEVICE INTO RIGHT ATRIUM, PERCUTANEOUS APPROACH: ICD-10-PCS | Performed by: RADIOLOGY

## 2019-02-15 RX ADMIN — DOCUSATE SODIUM AND SENNOSIDES SCH: 50; 8.6 TABLET ORAL at 10:41

## 2019-02-15 RX ADMIN — METHOCARBAMOL PRN MG: 750 TABLET ORAL at 07:39

## 2019-02-15 RX ADMIN — OXYCODONE HYDROCHLORIDE PRN MG: 15 TABLET ORAL at 21:40

## 2019-02-15 RX ADMIN — OXYCODONE HYDROCHLORIDE PRN MG: 15 TABLET ORAL at 09:38

## 2019-02-15 RX ADMIN — DOCUSATE SODIUM AND SENNOSIDES SCH TAB: 50; 8.6 TABLET ORAL at 21:35

## 2019-02-15 RX ADMIN — OXYCODONE HYDROCHLORIDE PRN MG: 15 TABLET ORAL at 09:34

## 2019-02-15 RX ADMIN — OXYCODONE HYDROCHLORIDE PRN MG: 15 TABLET ORAL at 17:56

## 2019-02-15 RX ADMIN — LISINOPRIL SCH MG: 10 TABLET ORAL at 09:34

## 2019-02-15 RX ADMIN — Medication SCH MLS: at 21:35

## 2019-02-15 RX ADMIN — ACETAMINOPHEN PRN MG: 325 TABLET ORAL at 12:44

## 2019-02-15 RX ADMIN — ACETAMINOPHEN PRN MG: 325 TABLET ORAL at 01:49

## 2019-02-15 NOTE — ASMTCMCOM
CM Note

 

CM Note                       

Notes:

CM spoke to Rafal at [facility]. [facility] has declined pt at this time. CM informed pt of this. Pt 

reports that she does not think it is a good idea to be in her home and bed bound. Pt is concerned 

about getting up to her second floor apartment. CM informed PT to work on stairs w/ pt. Pt went to 

IR to get a new picc line today. CM spoke to Dr. Tran and pt will require ivabx at time of 

d/c. CM sent a referral to [facility]. CM discussed pts case w/ Venecia Villanueva. Pts drains are pulled 


today. CM spoke to pts sister Sofia that lives in [place]. Sofia reports that their two brothers 

will come and clean pts apartment tomorrow and on Sunday. Sofia is concerned about pts mental 

health. CM provided Sofia w/ the website to psychology today for her to find a therapist for 

pt. CM spoke to Jackie about this case yesterday. CM to follow.







Plan: [facility], PT, OT, RN, [facility]

 

Date Signed:  02/15/2019 11:31 AM

Electronically Signed By:PIEDAD Toure

## 2019-02-15 NOTE — PCMIDPN
Assessment/Plan: 


Assessment:  58-year-old woman with complicated methicillin-susceptible Staph 

aureus bloodstream infection complicated by multilevel epidural abscess left 

elbow septic bursitis, and bilateral iliopsoas abscesses.  Blood cultures 

remain negative from February 11th with a plan for her left-sided PICC line to 

be her definitive IV Access to complete treatment.  Concerned her new fever 

might be an adverse effects from nafcillin will switch to cefazolin at this 

point which would be her long-term therapy going forward.  Susceptibility 

results for rifampin have returned, will add rifampin as an adjunct starting 

tomorrow.





1. Nosocomial fever, no focal symptoms; suspect possible side effect of 

nafcillin


2. Complicated methicillin-susceptible Staph aureus bloodstream infection, 

initial source unclear; improving


3. Multilevel epidural abscess with MSSA, status post laminectomy/washout at T4-

5, T10, L4-L5 on 2/6/2019


4. Status post iliopsoas and gluteal abscess drainage 2/7/2019 with IR


5. Status post incision and drainage left olecranon bursitis, 2/10/2019


6. Status post SALAS, 2/11/2019; negative for valvular vegetation





Plan:


1. Stop nafcillin


2. Start cefazolin 2 g q.8 hours


3. Start rifampin 600 mg daily tomorrow


4. Discussed in detail potential side effects of cefazolin and rifampin which 

include rash, antibiotic associated diarrhea, C diff colitis, phlebitis 





Simone Tran MD


Infectious Diseases





02/15/19 14:26





Subjective: 


No fever or chills overnight.  Did develop fever today with facial flushing and 

diaphoresis.  No abdominal pain or diarrhea.  No dysuria.  She underwent 

removal of both posterior drains 1 in the iliopsoas abscess area and 1 in the 

gluteal abscess area.  She also had her right-sided PICC line removed and the 

left-sided PICC line placed today.  She notes no new rashes, joint pains, 

myalgias.





Objective: 


 Vital Signs











Temp Pulse Resp BP Pulse Ox


 


 36.7 C   107 H  18   145/89 H  92 


 


 02/15/19 13:59  02/15/19 12:00  02/15/19 12:00  02/15/19 12:00  02/15/19 12:00








 Microbiology











 02/10/19 14:35 Gram Stain - Final





 Elbow - Eswab 


 


 02/07/19 13:05 Gram Stain - Final





 Back - Aspirate 


 


 02/06/19 14:46 Gram Stain - Final





 Back - Eswab 


 


 02/04/19 18:23 Gram Stain - Final





 Knee - Aspirate Body Fluid Culture - Final


 


 02/10/19 10:25 Blood Culture - Final





 Blood    Staphylococcus Aureus


 


 02/07/19 12:52 Gram Stain - Final





 Pelvis - Aspirate Anaerobic Culture - Final





    Staphylococcus Aureus


 


 02/08/19 04:30 Blood Culture - Final





 Blood    Staphylococcus Aureus








 Laboratory Results





 02/15/19 12:55 





 02/15/19 12:55 





 











 02/14/19 02/15/19 02/16/19





 05:59 05:59 05:59


 


Intake Total 1680 800 


 


Output Total 3400 2200 


 


Balance -1720 -1400 








 











ESR  72 MM/HR (0-30)  H  02/05/19  04:58    


 


C-Reactive Protein  204.9 mg/L (<10.0)  H  02/05/19  04:58    








Medications











Generic Name Dose Route Start Last Admin





  Trade Name Freq  PRN Reason Stop Dose Admin


 


Cefazolin Sodium/Dextrose  100 mls @ 200 mls/hr  02/15/19 22:00  





  Ancef  IV  03/17/19 21:59  





  Q8HRS [facility]   





  Protocol   














Discontinued Medications














Generic Name Dose Route Start Last Admin





  Trade Name Freq  PRN Reason Stop Dose Admin


 


Nafcillin Sodium 12 gm/  1,000 mls @ 41.667 mls/hr  02/11/19 18:00  02/14/19 18:

35





  Dextrose  IV  03/13/19 17:59  1,000 mls





  CONT@1800 [facility]   








Microbiology





02/10/19 14:35   Elbow - Eswab   Gram Stain - Final


02/10/19 10:25   Blood   Blood Culture - Final


                              Staphylococcus Aureus


02/08/19 04:45   Blood   Blood Culture - Final


                              Staphylococcus Aureus


02/08/19 04:30   Blood   Blood Culture - Final


                              Staphylococcus Aureus


02/11/19 17:00   Blood   Blood Culture - Preliminary


02/11/19 16:23   Blood   Blood Culture - Preliminary


02/10/19 14:35   Elbow - Eswab   Anaerobic Culture - Preliminary


                              Staphylococcus Aureus





 Laboratory Tests











  02/13/19 02/14/19 02/15/19





  04:40 05:30 12:55


 


WBC  9.39   9.64 H


 


Hgb  7.6 L  7.3 L  7.7 L


 


Plt Count  318   252


 


Absolute Seg Neuts    8.87 H


 


Absolute Lymphocytes    0.48 L


 


Absolute Eosinophils    0.00 L


 


Creatinine   














  02/15/19





  12:55


 


WBC 


 


Hgb 


 


Plt Count 


 


Absolute Seg Neuts 


 


Absolute Lymphocytes 


 


Absolute Eosinophils 


 


Creatinine  0.6














- Physical Exam


General Appearance: no apparent distress, obese, non-toxic


EENT: No scleral icterus


Respiratory: normal breath sounds, No crackles, No wheezing


Neck: full range of motion, supple


Cardiac/Chest: regular rate, rhythm, systolic murmur, No bradycardia, No 

tachycardia, No diastolic murmur


Extremities: No erythema


Abdomen: normal bowel sounds, non-tender, soft, No distended, No guarding


Skin: other (Facial flushing)


Neuro/Psych: alert, normal mood/affect, oriented x 3, No confused





- Line/s


  ** LUE PICC


Lines: No drainage, No erythema





- Time Spent With Patient


Time Spent with Patient: greater than 25 minutes


Time Spent with Patient: Greater than 25 minutes spent on this patients care, 

greater than 50% of time spent counseling, educating, and coordinating care 

regarding the above mentioned plan.





ICD10 Worksheet


Patient Problems: 


 Problems











Problem Status Onset


 


Effusion, left knee Acute  


 


Hypokalemia Acute  


 


Hyponatremia Acute  


 


Leukocytosis Acute  


 


UTI (urinary tract infection) Acute  


 


Weakness Acute

## 2019-02-15 NOTE — HOSPPROG
Hospitalist Progress Note


Assessment/Plan: 





Ms Cortes is a patient with diffuse multiple MSSA abscess and bacteremia, very 

weak and poor food intake as she was stuck in bed at home for 2 weeks, s/p 

multiple procedures. 





#MSSA bacteremia with multiple metastatic foci including multiple epidural 

abscesses, s/p lami and washout T4-5, T9-10,L4-5, bilateral iliopsoas abscesses 

and paraspinal abscess.


-drains removed today in IR (2/15)


-Nafcillin dc today and started on cefazolin q 8, to start Rifampin tomorrow


-PICC on right arm removed w new left one placed this morning


-SALAS shows no evidence of endocarditis


-fever now-will get blood cultures again, most recent ones showed no growth





#Acute sepsis with organ failure 


-resolved





#Anemia


-had been transfused


-hgb and hct have been overall stable/ low but no significant drop





#Olecranon bursitis


-I&D w Dr Hilton on 2/10


-elbow looks good (minimal redness) Penrose fell out





#hyperglycemia


-A1c is 6.2, for diagnosis of Diabetes it is a A1c >6.5, appreciate dietary 

seeing her


-family hx of diabetes





#Acute polyarticular inflammatory arthropathy with effusion in knee


-resolved





#Acute kidney injury


-resolved





#Hyponatremia


-Na levels had improved and had lifted her fluid restriction


-Na dropped to 126; will restart fluid restrictions, hold her HCTZ





#hypoalbuminemia


-eating better today





#Acute metabolic encephalopathy due to all of above 


-resolved





#Hypokalemia


-resolved





#Pyuria 


-no c/o frequency, urgency





#Generalized weakness and deconditioning due to above


-PT/OT





#Chronic back pain


-stable





#obesity w a BMI of 31





#dvt prophylaxis: LMWH has been on hold due to blood loss, will follow closely 

and resume when stable. She has athrombic pumps on, ambulating more. Was able 

to walk down the hallway today. 





#plan: follow blood cx from today, patient has no symptoms of urinary issues, 

has no pain.  She doesn't qualify for a SNF due to her age. Plan is for her to 

return home w home care.  Her brother who lives locally is going to her apt to 

help clean things up.  She has a sister out of state who is planning to come 

next week to stay w her.  She will need home IV abx. Will recheck urine studies 

and sodium levels.








Subjective: Chloe is c/o fever, feeling flushed.  Is very upset she is unable 

to go to a SNF.


Objective: 


 Vital Signs











Temp Pulse Resp BP Pulse Ox


 


 38.8 C H  107 H  18   145/89 H  92 


 


 02/15/19 12:00  02/15/19 12:00  02/15/19 12:00  02/15/19 12:00  02/15/19 12:00








 Microbiology











 02/10/19 10:25 Blood Culture - Final





 Blood    Staphylococcus Aureus


 


 02/10/19 14:35 Gram Stain - Final





 Elbow - Eswab 


 


 02/07/19 13:05 Gram Stain - Final





 Back - Aspirate 


 


 02/07/19 12:52 Gram Stain - Final





 Pelvis - Aspirate Anaerobic Culture - Final





    Staphylococcus Aureus


 


 02/06/19 14:46 Gram Stain - Final





 Back - Eswab 


 


 02/04/19 18:23 Gram Stain - Final





 Knee - Aspirate Body Fluid Culture - Final


 


 02/08/19 04:30 Blood Culture - Final





 Blood    Staphylococcus Aureus








 Laboratory Results





 02/14/19 05:30 





 02/15/19 03:00 





 











 02/14/19 02/15/19 02/16/19





 05:59 05:59 05:59


 


Intake Total 1680 800 


 


Output Total 3400 2200 


 


Balance -1720 -1400 








 











PT  16.6 SEC (12.0-15.0)  H  02/07/19  05:20    


 


INR  1.32  (0.83-1.16)  H  02/07/19  05:20    














- Physical Exam


Constitutional: not in pain, chronically ill appearing, obese


Eyes: PERRL


Ears, Nose, Mouth, Throat: hearing normal


Cardiovascular: regular rate and rhythym


Respiratory: no respiratory distress, reduced air movement


Gastrointestinal: normoactive bowel sounds


Skin: warm


Musculoskeletal: generalized weakness


Neurologic: AAOx3


Psychiatric: interacting appropriately, anxious





ICD10 Worksheet


Patient Problems: 


 Problems











Problem Status Onset


 


Effusion, left knee Acute  


 


Hypokalemia Acute  


 


Hyponatremia Acute  


 


Leukocytosis Acute  


 


UTI (urinary tract infection) Acute  


 


Weakness Acute

## 2019-02-15 NOTE — SOAPPROG
SOAP Progress Note


Assessment/Plan: 


Assessment: Caitlin is s/p left elbow I&D. She reports she is doing well 

without elbow pain





PE: Incision is well approximated without warmth, or drainage. Slight erythema 

similar to last visit. ROM of the elbow is close to fall and is without pain. 








Plan: Dressing placed. Keep working on elbow ROM. Plan on suture removal 14 

days postop 





02/13/19 20:50





02/15/19 16:25





Objective: 





 Vital Signs











Temp Pulse Resp BP Pulse Ox


 


 36.9 C   95   16   97/61 L  95 


 


 02/15/19 15:14  02/15/19 15:14  02/15/19 15:14  02/15/19 15:14  02/15/19 15:14








 Microbiology











 02/10/19 10:25 Blood Culture - Final





 Blood    Staphylococcus Aureus


 


 02/10/19 14:35 Gram Stain - Final





 Elbow - Eswab 


 


 02/07/19 13:05 Gram Stain - Final





 Back - Aspirate 


 


 02/06/19 14:46 Gram Stain - Final





 Back - Eswab 


 


 02/04/19 18:23 Gram Stain - Final





 Knee - Aspirate Body Fluid Culture - Final


 


 02/07/19 12:52 Gram Stain - Final





 Pelvis - Aspirate Anaerobic Culture - Final





    Staphylococcus Aureus


 


 02/08/19 04:30 Blood Culture - Final





 Blood    Staphylococcus Aureus








 Laboratory Results





 02/15/19 12:55 





 02/15/19 12:55 





 











 02/14/19 02/15/19 02/16/19





 05:59 05:59 05:59


 


Intake Total 1680 800 


 


Output Total 3400 2200 


 


Balance -1720 -1400 








 











PT  16.6 SEC (12.0-15.0)  H  02/07/19  05:20    


 


INR  1.32  (0.83-1.16)  H  02/07/19  05:20    














ICD10 Worksheet


Patient Problems: 


 Problems











Problem Status Onset


 


Effusion, left knee Acute  


 


Hypokalemia Acute  


 


Hyponatremia Acute  


 


Leukocytosis Acute  


 


UTI (urinary tract infection) Acute  


 


Weakness Acute

## 2019-02-16 LAB — PLATELET # BLD: 228 10^3/UL (ref 150–400)

## 2019-02-16 RX ADMIN — OXYCODONE HYDROCHLORIDE PRN MG: 15 TABLET ORAL at 07:40

## 2019-02-16 RX ADMIN — METHOCARBAMOL PRN MG: 750 TABLET ORAL at 10:22

## 2019-02-16 RX ADMIN — METHOCARBAMOL PRN MG: 750 TABLET ORAL at 21:57

## 2019-02-16 RX ADMIN — OXYCODONE HYDROCHLORIDE PRN MG: 15 TABLET ORAL at 12:10

## 2019-02-16 RX ADMIN — RIFAMPIN SCH MG: 300 CAPSULE ORAL at 07:41

## 2019-02-16 RX ADMIN — DOCUSATE SODIUM AND SENNOSIDES SCH TAB: 50; 8.6 TABLET ORAL at 21:57

## 2019-02-16 RX ADMIN — METHOCARBAMOL PRN MG: 750 TABLET ORAL at 15:20

## 2019-02-16 RX ADMIN — Medication SCH MLS: at 13:58

## 2019-02-16 RX ADMIN — OXYCODONE HYDROCHLORIDE PRN MG: 15 TABLET ORAL at 18:17

## 2019-02-16 RX ADMIN — Medication SCH MLS: at 05:19

## 2019-02-16 RX ADMIN — OXYCODONE HYDROCHLORIDE PRN MG: 15 TABLET ORAL at 21:57

## 2019-02-16 RX ADMIN — LISINOPRIL SCH MG: 10 TABLET ORAL at 08:40

## 2019-02-16 RX ADMIN — DOCUSATE SODIUM AND SENNOSIDES SCH: 50; 8.6 TABLET ORAL at 10:25

## 2019-02-16 RX ADMIN — Medication SCH MLS: at 21:57

## 2019-02-16 RX ADMIN — OXYCODONE HYDROCHLORIDE PRN MG: 15 TABLET ORAL at 15:21

## 2019-02-16 NOTE — HOSPPROG
Hospitalist Progress Note


Assessment/Plan: 





Ms Cortes is a patient with diffuse multiple MSSA abscess and bacteremia, very 

weak and poor food intake as she was stuck in bed at home for 2 weeks, s/p 

multiple procedures. 





#MSSA bacteremia with multiple metastatic foci including multiple epidural 

abscesses, s/p lami and washout T4-5, T9-10,L4-5, bilateral iliopsoas abscesses 

and paraspinal abscess. Currently treated with cefazolin and rifampin, repeat 

cultures from 2/15 with ngtd, last positive blood cultures 2/8


# severe sepsis: 2/2 above, resolved


#Anemia: with ongoing decline since admission, transfused on 2/9, no e/o blood 

loss, will get iron studies, tx  for h/h < 7/21


#Olecranon bursitis: s/p I&D, improved


#hyperglycemia: with a1c of 6.2 and elevation likley due to infection


#Acute polyarticular inflammatory arthropathy with effusion in knee


#Acute kidney injury: resolved


#Hyponatremia: holding hctz, has been on fluid restriction and Na of 132 today ,

will dc restriction


#hypoalbuminemia: 2/2 nutriontion as well as acute phase reactant in setting of 

sepsis/abscess


# metabolic encephalopathy: resolved


#Hypokalemia-resolved


#Pyuria : asymptomatic


#Generalized weakness and deconditioning due to abov


#obesity w a BMI of 31


# IP status, plan to dc home with home health


Patient new to my care. Old records reviewed and summarized as above. 





Subjective: no significant overnight events, patient feeling a bit better but 

still aquite weak, has been ambulating with pt


Objective: 


 Vital Signs











Temp Pulse Resp BP Pulse Ox


 


 36.6 C   84   18   125/77 H  96 


 


 02/16/19 12:00  02/16/19 12:00  02/16/19 12:00  02/16/19 12:00  02/16/19 12:00








 Microbiology











 02/10/19 10:25 Blood Culture - Final





 Blood    Staphylococcus Aureus


 


 02/10/19 14:35 Gram Stain - Final





 Elbow - Eswab 


 


 02/07/19 13:05 Gram Stain - Final





 Back - Aspirate 


 


 02/06/19 14:46 Gram Stain - Final





 Back - Eswab 


 


 02/04/19 18:23 Gram Stain - Final





 Knee - Aspirate Body Fluid Culture - Final








 Laboratory Results





 02/16/19 10:45 





 02/16/19 10:45 





 











 02/15/19 02/16/19 02/17/19





 05:59 05:59 05:59


 


Intake Total 800 2000 


 


Output Total 2200 3200 


 


Balance -1400 -1200 








 











PT  16.6 SEC (12.0-15.0)  H  02/07/19  05:20    


 


INR  1.32  (0.83-1.16)  H  02/07/19  05:20    








chronically ill appearing


terminal hairs on chin/upperlip


op clear


rrr no mrg


cta b


soft nt nd


trace le edema


warm dry well perfused


oriented


general weakness





ICD10 Worksheet


Patient Problems: 


 Problems











Problem Status Onset


 


Effusion, left knee Acute  


 


Hyponatremia Acute  


 


Hypokalemia Acute  


 


Weakness Acute  


 


Leukocytosis Acute  


 


UTI (urinary tract infection) Acute

## 2019-02-16 NOTE — PCMIDPN
Assessment/Plan: 


Assessment:  58-year-old woman with complicated methicillin-susceptible Staph 

aureus bloodstream infection complicated by multilevel epidural abscess left 

elbow septic bursitis, and bilateral iliopsoas abscesses.  Fever yesterday of 

unclear cause but did change from nafcillin to cefazolin which tends to be 

better tolerated over time.  Her new left buttock and hip pain may well be 

secondary to drain removal yesterday, but will keep a close eye on this and 

determine if reimaging needs to occur.  Overall she is significantly improved 

from admission as had extensive surgical source control with no new areas of 

bone or joint infection that are clinically evident.





1. Nosocomial fever, no focal symptoms; suspect possible side effect of 

nafcillin; resolved


2. Complicated methicillin-susceptible Staph aureus bloodstream infection, 

initial source unclear; improving


3. Multilevel epidural abscess with MSSA, status post laminectomy/washout at T4-

5, T10, L4-L5 on 2/6/2019


4. Status post iliopsoas and gluteal abscess drainage 2/7/2019 with IR


5. Status post incision and drainage left olecranon bursitis, 2/10/2019


6. Status post SALAS, 2/11/2019; negative for valvular vegetation





Plan:


1. Continue cefazolin 2 g q.8 hours


2. Continue rifampin 600 mg daily tomorrow


3. Discussed in detail potential side effects of cefazolin and rifampin which 

include rash, antibiotic associated diarrhea, C diff colitis, phlebitis 


4. Repeat labs tomorrow ordered


5. Discharge plan for antibiotics will ideally includes cefazolin given as 6-8 

g Q 24 hr by continuous infusion with rifampin orally as an adjunct





Simone Tran MD


Infectious Diseases





02/16/19 10:44





Subjective: 


No repeat fevers after the 1 episode yesterday.  She is having pain in her left 

buttock and left hip after drain removal yesterday.  She notes no nausea, 

diarrhea, or rash.  Left-sided PICC line not causing irritation.





Objective: 


 Vital Signs











Temp Pulse Resp BP Pulse Ox


 


 36.8 C   83   16   132/76 H  97 


 


 02/16/19 07:30  02/16/19 07:30  02/16/19 07:30  02/16/19 07:30  02/16/19 07:30








 Microbiology











 02/10/19 10:25 Blood Culture - Final





 Blood    Staphylococcus Aureus


 


 02/10/19 14:35 Gram Stain - Final





 Elbow - Eswab 


 


 02/07/19 13:05 Gram Stain - Final





 Back - Aspirate 


 


 02/06/19 14:46 Gram Stain - Final





 Back - Eswab 


 


 02/04/19 18:23 Gram Stain - Final





 Knee - Aspirate Body Fluid Culture - Final








 Laboratory Results





 02/15/19 12:55 





 02/16/19 04:09 





 











 02/15/19 02/16/19 02/17/19





 05:59 05:59 05:59


 


Intake Total 800 2000 


 


Output Total 2200 3200 


 


Balance -1400 -1200 








 











ESR  72 MM/HR (0-30)  H  02/05/19  04:58    


 


C-Reactive Protein  204.9 mg/L (<10.0)  H  02/05/19  04:58    








Medications











Generic Name Dose Route Start Last Admin





  Trade Name Freq  PRN Reason Stop Dose Admin


 


Cefazolin Sodium/Dextrose  100 mls @ 200 mls/hr  02/15/19 22:00  02/16/19 05:19





  Ancef  IV  03/17/19 21:59  100 mls





  Q8HRS [facility]   





  Protocol   


 


Rifampin  600 mg  02/16/19 07:30  02/16/19 07:41





  Rifadin  PO  03/18/19 07:29  600 mg





  [facility]   





  Protocol   














Discontinued Medications














Generic Name Dose Route Start Last Admin





  Trade Name Freq  PRN Reason Stop Dose Admin


 


Nafcillin Sodium 12 gm/  1,000 mls @ 41.667 mls/hr  02/11/19 18:00  02/14/19 18:

35





  Dextrose  IV  03/13/19 17:59  1,000 mls





  CONT@1800 [facility]   








Microbiology





02/11/19 17:00   Blood   Blood Culture - Preliminary


02/11/19 16:23   Blood   Blood Culture - Preliminary





 Laboratory Tests











  02/09/19 02/13/19 02/15/19





  06:25 04:40 12:55


 


WBC   9.39  9.64 H


 


Hgb   7.6 L  7.7 L


 


Plt Count   318  252


 


Absolute Seg Neuts  12.33 H   8.87 H


 


Absolute Lymphocytes  1.60   0.48 L


 


Creatinine   














  02/15/19





  12:55


 


WBC 


 


Hgb 


 


Plt Count 


 


Absolute Seg Neuts 


 


Absolute Lymphocytes 


 


Creatinine  0.6














- Physical Exam


General Appearance: alert, no apparent distress, obese, non-toxic


EENT: No scleral icterus


Neck: full range of motion, supple


Extremities: No erythema


Abdomen: non-tender, soft, No distended, No guarding


Skin: No erythema


Neuro/Psych: alert, normal mood/affect, oriented x 3, No confused





- Time Spent With Patient


Time Spent with Patient: greater than 35 minutes (Discussed overall antibiotic 

Plan, potential side effects of rifampin, potential side effects of Cephazolin, 

plan for CT scanning depending on resolution of pain in the left hip and if any 

fevers recur)


Time Spent with Patient: Greater than 35 minutes spent on this patients care, 

greater than 50% of time spent counseling, educating, and coordinating care 

regarding the above mentioned plan.





ICD10 Worksheet


Patient Problems: 


 Problems











Problem Status Onset


 


Effusion, left knee Acute  


 


Hypokalemia Acute  


 


Hyponatremia Acute  


 


Leukocytosis Acute  


 


UTI (urinary tract infection) Acute  


 


Weakness Acute

## 2019-02-17 LAB — PLATELET # BLD: 239 10^3/UL (ref 150–400)

## 2019-02-17 RX ADMIN — Medication SCH MLS: at 21:31

## 2019-02-17 RX ADMIN — OXYCODONE HYDROCHLORIDE PRN MG: 15 TABLET ORAL at 21:45

## 2019-02-17 RX ADMIN — OXYCODONE HYDROCHLORIDE PRN MG: 15 TABLET ORAL at 14:33

## 2019-02-17 RX ADMIN — OXYCODONE HYDROCHLORIDE PRN MG: 15 TABLET ORAL at 11:30

## 2019-02-17 RX ADMIN — METHOCARBAMOL PRN MG: 750 TABLET ORAL at 14:33

## 2019-02-17 RX ADMIN — LISINOPRIL SCH MG: 10 TABLET ORAL at 08:48

## 2019-02-17 RX ADMIN — OXYCODONE HYDROCHLORIDE PRN MG: 15 TABLET ORAL at 08:09

## 2019-02-17 RX ADMIN — OXYCODONE HYDROCHLORIDE PRN MG: 15 TABLET ORAL at 17:33

## 2019-02-17 RX ADMIN — RIFAMPIN SCH MG: 300 CAPSULE ORAL at 08:08

## 2019-02-17 RX ADMIN — METHOCARBAMOL PRN MG: 750 TABLET ORAL at 08:10

## 2019-02-17 RX ADMIN — DOCUSATE SODIUM AND SENNOSIDES SCH: 50; 8.6 TABLET ORAL at 13:24

## 2019-02-17 RX ADMIN — Medication SCH MLS: at 13:58

## 2019-02-17 RX ADMIN — DOCUSATE SODIUM AND SENNOSIDES SCH TAB: 50; 8.6 TABLET ORAL at 21:31

## 2019-02-17 RX ADMIN — Medication SCH MLS: at 05:03

## 2019-02-17 RX ADMIN — METHOCARBAMOL PRN MG: 750 TABLET ORAL at 21:45

## 2019-02-17 RX ADMIN — OXYCODONE HYDROCHLORIDE PRN MG: 15 TABLET ORAL at 01:21

## 2019-02-17 NOTE — ASMTCMCOM
CM Note

 

CM Note                       

Notes:

Reviewed chart, spoke with ZEB Contreras and LAI Max regarding discharge plan of care, pt's 

progress. Per Winter, pt was previously planning to discharge home to her brother's house with 

[facility] (RN/PT/OT) and [facility] for 6 wks of IV infusion. Per Winter, today the pt 


had a falling out with her brother. It is unclear if the pt can discharge to his house or if she 

will need to return to her own home. Pt's sister reports that the pt's apartment is unsafe and 

unsanitary. The pt's sister is concerned about the pt's mental health.



Multiple staff recommend pt discharge to SNF secondary to safety concerns. Per prior CM notes, the 

pt has been declined at [facility] secondary to pt's inability to 

qualify for disability and limited income sources. [facility] process previously started.



Pt has a neighbor caring for her cats while she is the hospital. Per notes, if pt discharges to SNF 


for 30 days, the neighbor cannot continue to care for them. Discharge plan remains unclear at this 

time. CM will continue to follow.



Discharge Plan: To be determined

 

Date Signed:  02/17/2019 04:25 PM

Electronically Signed By:Mi Mendosa RN

## 2019-02-17 NOTE — HOSPPROG
Hospitalist Progress Note


Assessment/Plan: 





Ms Cortes is a patient with diffuse multiple MSSA abscess and bacteremia, very 

weak and poor food intake as she was stuck in bed at home for 2 weeks, s/p 

multiple procedures. 





#MSSA bacteremia with multiple metastatic foci including multiple epidural 

abscesses, s/p lami and washout T4-5, T9-10,L4-5, bilateral iliopsoas abscesses 

and paraspinal abscess. Currently treated with cefazolin and rifampin, repeat 

cultures from 2/15 with ngtd, last positive blood cultures 2/8


# severe sepsis: 2/2 above, resolved. Did have fever last night with resolution 

since then, unclear etiology, continue to trend 


#Anemia: with ongoing decline since admission, transfused on 2/9, no e/o blood 

loss, iron studies c/w anemia of chronic disease related to above, tx  for h/h 

< 7/21


#Olecranon bursitis: s/p I&D, improved


#hyperglycemia: with a1c of 6.2 and elevation likley due to infection


#Acute polyarticular inflammatory arthropathy with effusion in knee


#Acute kidney injury: resolved


#Hyponatremia: holding hctz, Na continues to stabilize, discontinued on fluid 

restriction and monitoring


#hypoalbuminemia: 2/2 nutrition as well as acute phase reactant in setting of 

sepsis/abscess


# metabolic encephalopathy: resolved


#Hypokalemia-resolved


#Pyuria : asymptomatic


#Generalized weakness and deconditioning due to abov


#obesity w a BMI of 31


# IP status, plan to dc home with home health in coming 1-2 days, patient is 

arranging with her family another place to stay as her home requires her to 

climb 1 flight of stairs to get in. CM involved. 





Subjective: no significant overnight events, patient continues to improve slowly

, notes she has been walking with pt/ot


Objective: 


 Vital Signs











Temp Pulse Resp BP Pulse Ox


 


 36.6 C   86   18   140/89 H  95 


 


 02/17/19 11:35  02/17/19 11:35  02/17/19 11:35  02/17/19 11:35  02/17/19 11:35








 Microbiology











 02/11/19 17:00 Blood Culture - Final





 Blood 


 


 02/11/19 16:23 Blood Culture - Final





 Blood 








 Laboratory Results





 02/17/19 04:15 





 02/17/19 04:15 





 











 02/16/19 02/17/19 02/18/19





 05:59 05:59 05:59


 


Intake Total 2000 1200 


 


Output Total 3200 3600 400


 


Balance -1200 -2400 -400








 











PT  16.6 SEC (12.0-15.0)  H  02/07/19  05:20    


 


INR  1.32  (0.83-1.16)  H  02/07/19  05:20    








chronically ill appearing


terminal hairs on chin/upperlip


op clear


rrr no mrg


cta b


soft nt nd


trace le edema


warm dry well perfused


oriented


general weakness





ICD10 Worksheet


Patient Problems: 


 Problems











Problem Status Onset


 


Effusion, left knee Acute  


 


Hypokalemia Acute  


 


Hyponatremia Acute  


 


Leukocytosis Acute  


 


UTI (urinary tract infection) Acute  


 


Weakness Acute

## 2019-02-17 NOTE — PCMIDPN
Assessment/Plan: 


Assessment:  58-year-old woman with complicated methicillin-susceptible Staph 

aureus bloodstream infection complicated by multilevel epidural abscess left 

elbow septic bursitis, and bilateral iliopsoas abscesses.  No further fevers 

and repeat blood cultures negative to date.  Suspect the fever is either side 

effect of nafcillin or results of removing her posterior drains.  Her back pain 

may be related to removal of the drains, recurrence or reaccumulation of 

abscess possible favor holding off on repeat CT for as long as feasible to 

ensure that any finding isn't simply resolving areas of infection from her 

debridements.  She remains quite limited in her ability to move herself around 

and may have difficulty at home managing IV antibiotics and Clinic follow-up.





1. Nosocomial fever, no focal symptoms; suspect possible side effect of 

nafcillin; resolved without recurrence


2. Complicated methicillin-susceptible Staph aureus bloodstream infection, 

initial source unclear; improving


3. Multilevel epidural abscess with MSSA, status post laminectomy/washout at T4-

5, T10, L4-L5 on 2/6/2019


4. Status post iliopsoas and gluteal abscess drainage 2/7/2019 with IR


5. Status post incision and drainage left olecranon bursitis, 2/10/2019


6. Status post SALAS, 2/11/2019; negative for valvular vegetation





Plan:


1. Continue cefazolin 2 g q.8 hours


2. Continue rifampin 600 mg daily


3. Discussed in detail potential side effects of cefazolin and rifampin which 

include rash, antibiotic associated diarrhea, C diff colitis, phlebitis 


4. Laboratory monitoring weekly from an ID perspective


5. Discharge plan for antibiotics will ideally includes cefazolin given as 6-8 

g Q 24 hr by continuous infusion with rifampin orally as an adjunct





Simone Tran MD


Infectious Diseases





02/17/19 12:10





Subjective: 


No fevers, chills, night sweats.  No new rashes.  She is having low back pain 

extending down to the superior aspect of her left buttock.  She is unable to 

ambulate as much as she would like to yesterday due to this pain.  She notes no 

diarrhea, nausea, abdominal pain.





Objective: 


 Vital Signs











Temp Pulse Resp BP Pulse Ox


 


 36.6 C   86   18   140/89 H  95 


 


 02/17/19 11:35  02/17/19 11:35  02/17/19 11:35  02/17/19 11:35  02/17/19 11:35








 Microbiology











 02/11/19 17:00 Blood Culture - Final





 Blood 


 


 02/11/19 16:23 Blood Culture - Final





 Blood 








 Laboratory Results





 02/17/19 04:15 





 02/17/19 04:15 





 











 02/16/19 02/17/19 02/18/19





 05:59 05:59 05:59


 


Intake Total 2000 1200 


 


Output Total 3200 3600 400


 


Balance -1200 -2400 -400








 











ESR  72 MM/HR (0-30)  H  02/05/19  04:58    


 


C-Reactive Protein  204.9 mg/L (<10.0)  H  02/05/19  04:58    








Medications











Generic Name Dose Route Start Last Admin





  Trade Name Freq  PRN Reason Stop Dose Admin


 


Cefazolin Sodium/Dextrose  100 mls @ 200 mls/hr  02/15/19 22:00  02/17/19 05:03





  Ancef  IV  03/17/19 21:59  100 mls





  Q8HRS [facility]   





  Protocol   


 


Rifampin  600 mg  02/16/19 07:30  02/17/19 08:08





  Rifadin  PO  03/18/19 07:29  600 mg





  DAILYAC [facility]   





  Protocol   








 Laboratory Tests











  02/16/19 02/17/19 02/17/19





  10:45 04:15 04:15


 


WBC  4.75  6.37 


 


Hgb  7.0 L  6.9 L 


 


Plt Count  228  239 


 


Creatinine    0.6


 


AST    16


 


ALT    25














- Physical Exam


General Appearance: no apparent distress, obese, non-toxic


EENT: No scleral icterus


Respiratory: lungs clear, normal breath sounds, No respiratory distress, No 

crackles, No wheezing


Neck: full range of motion, supple


Cardiac/Chest: regular rate, rhythm, No bradycardia, No tachycardia, No 

diastolic murmur, No systolic murmur


Abdomen: normal bowel sounds, non-tender, soft, No distended, No guarding


Back: other (Tenderness around the surgical incision sites without erythema, 

fluctuance, or induration)


Skin: other (Spinal incision site over upper thoracic, lower thoracic, and 

lumbar spine with Steri-Strips in place no surrounding erythema, induration, or 

fluctuance; the right lower back drain removal site with bandage in place no 

surrounding erythema; left upper buttock drain removal site uncovered, no 

surrounding erythema)


Neuro/Psych: alert, normal mood/affect, oriented x 3, No confused





- Time Spent With Patient


Time Spent with Patient: greater than 35 minutes


Time Spent with Patient: Greater than 35 minutes spent on this patients care, 

greater than 50% of time spent counseling, educating, and coordinating care 

regarding the above mentioned plan.





ICD10 Worksheet


Patient Problems: 


 Problems











Problem Status Onset


 


Effusion, left knee Acute  


 


Hypokalemia Acute  


 


Hyponatremia Acute  


 


Leukocytosis Acute  


 


UTI (urinary tract infection) Acute  


 


Weakness Acute

## 2019-02-18 VITALS — DIASTOLIC BLOOD PRESSURE: 105 MMHG | SYSTOLIC BLOOD PRESSURE: 161 MMHG

## 2019-02-18 LAB — PLATELET # BLD: 251 10^3/UL (ref 150–400)

## 2019-02-18 RX ADMIN — DOCUSATE SODIUM AND SENNOSIDES SCH TAB: 50; 8.6 TABLET ORAL at 09:27

## 2019-02-18 RX ADMIN — METHOCARBAMOL PRN MG: 750 TABLET ORAL at 09:27

## 2019-02-18 RX ADMIN — RIFAMPIN SCH MG: 300 CAPSULE ORAL at 09:27

## 2019-02-18 RX ADMIN — Medication SCH MLS: at 14:05

## 2019-02-18 RX ADMIN — OXYCODONE HYDROCHLORIDE PRN MG: 15 TABLET ORAL at 14:48

## 2019-02-18 RX ADMIN — Medication SCH MLS: at 05:36

## 2019-02-18 RX ADMIN — LISINOPRIL SCH MG: 10 TABLET ORAL at 09:27

## 2019-02-18 RX ADMIN — OXYCODONE HYDROCHLORIDE PRN MG: 15 TABLET ORAL at 09:27

## 2019-02-18 RX ADMIN — METHOCARBAMOL PRN MG: 750 TABLET ORAL at 14:48

## 2019-02-18 NOTE — PDIAF
- Diagnosis


Diagnosis: Complicated MSSA bloodstream infection with multi-level epidural 

abscesses


Code Status: Full Code





- Medication Management


Long Term Antibiotics: cefazolin 8gm q24 hours by continuous infusion; rifampin 

600mg PO daily


Long Term Antibiotic Stop Date: 03/25/19


Discharge Medications: electronically signed and located in the Home Medication 

List.


PICC Care - Routine: Yes





- Orders


Additional Instructions: 


No bending or twisting


Do not lift greater than 10 pounds








- Labs/Radiology


CBC w/diff Date: 02/25/19 (Weekly while on cefazolin)


CMP Date: 02/25/19 (Weekly while on cefazolin)


Call or Fax Lab and Imaging Results to: [facility]/

o Simone Tran MDFax: [phone]





- Follow Up Care


Current Providers and Referrals: 


Vincent Tillman MD [Medical Doctor] - follow up in 2 weeks


Patient,NotPresent [Unknown] - As per Instructions


Simone Tran MD [Medical Doctor] -

## 2019-02-18 NOTE — ASMTLACE
LACE

 

Length of stay for            Answers:  14 days or more                       

current admission                                                             

Acuity / Level of             Answers:  Yes                                   

Care: Did the patient                                                         

have an inpatient                                                             

admission?                                                                    

Comorbidities - select        Answers:  Opioid dependence                     

all that apply                          / Chronic pain                        

                                        Other                         Notes:  HTN

# of Emergency department     Answers:  3-4                                   

visits in the last 6                                                          

months                                                                        

Social determinants           Answers:  Mental health diagnosis               

                                        (anxiety, depression, pers            

                                        onality disorders, etc.)              

Score: 21

 

Date Signed:  02/18/2019 04:24 PM

Electronically Signed By:PIEDAD Toure
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
## 2019-02-18 NOTE — HOSPPROG
Hospitalist Progress Note


Assessment/Plan: 








Ms Cortes is a patient with diffuse multiple MSSA abscess and bacteremia, very 

weak and poor food intake as she was stuck in bed at home for 2 weeks, s/p 

multiple procedures. 





#MSSA bacteremia with multiple metastatic foci including multiple epidural 

abscesses, s/p lami and washout T4-5, T9-10,L4-5, bilateral iliopsoas abscesses 

and paraspinal abscess. Currently treated with cefazolin and rifampin, repeat 

cultures from 2/15 with ngtd, last positive blood cultures 2/8





# severe sepsis: 2/2 above, resolved. Did have fever last night with resolution 

since then, unclear etiology, continue to trend 





#Anemia: with ongoing decline since admission, transfused on 2/9, no e/o blood 

loss, iron studies c/w anemia of chronic disease related to above, tx  for h/h 

< 7/21





#Olecranon bursitis: s/p I&D, improved





#hyperglycemia: with a1c of 6.2 and elevation likley due to infection





#Acute polyarticular inflammatory arthropathy with effusion in knee





#Acute kidney injury: resolved





#Hyponatremia: holding hctz, Na continues to stabilize, discontinued on fluid 

restriction and monitoring





#hypoalbuminemia: 2/2 nutrition as well as acute phase reactant in setting of 

sepsis/abscess





# metabolic encephalopathy: resolved





#Hypokalemia-resolved





#Pyuria : asymptomatic





#Generalized weakness and deconditioning due to above





#obesity w a BMI of 31





# Plan: dc home, IV abx arranged by CM, her brother has had her house cleaned 

with a new mattress, home care.  


Subjective: Chloe was very concerned about being discharged home, wasn't sure 

her brother would let her return, not c/o any pain.


Objective: 


 Vital Signs











Temp Pulse Resp BP Pulse Ox


 


 36.3 C   86   16   144/95 H  94 


 


 02/18/19 11:43  02/18/19 11:43  02/18/19 11:43  02/18/19 11:43  02/18/19 11:43








 Microbiology











 02/10/19 14:35 Gram Stain - Final





 Elbow - Eswab 


 


 02/07/19 13:05 Gram Stain - Final





 Back - Aspirate 


 


 02/06/19 14:46 Gram Stain - Final





 Back - Eswab 


 


 02/04/19 18:23 Gram Stain - Final





 Knee - Aspirate Body Fluid Culture - Final


 


 02/11/19 17:00 Blood Culture - Final





 Blood 


 


 02/11/19 16:23 Blood Culture - Final





 Blood 








 Laboratory Results





 02/18/19 04:00 





 02/17/19 04:15 





 











 02/17/19 02/18/19 02/19/19





 05:59 05:59 05:59


 


Intake Total 1200 750 


 


Output Total 3600 1600 1150


 


Balance -2400 -850 -1150








 











PT  16.6 SEC (12.0-15.0)  H  02/07/19  05:20    


 


INR  1.32  (0.83-1.16)  H  02/07/19  05:20    














- Physical Exam


Constitutional: appears nourished, not in pain, obese


Eyes: PERRL


Ears, Nose, Mouth, Throat: hearing normal


Cardiovascular: regular rate and rhythym


Respiratory: no respiratory distress


Gastrointestinal: normoactive bowel sounds


Skin: warm, other (back incisions well approximated, steri strips in place, no 

redness, no drainage.  left elbow w sutures, soft, non tender, not red or 

swollen. )


Musculoskeletal: generalized weakness


Neurologic: AAOx3


Psychiatric: anxious





ICD10 Worksheet


Patient Problems: 


 Problems











Problem Status Onset


 


Effusion, left knee Acute  


 


Hypokalemia Acute  


 


Hyponatremia Acute  


 


Leukocytosis Acute  


 


UTI (urinary tract infection) Acute  


 


Weakness Acute

## 2019-02-18 NOTE — ASMTDCNOTE
Case Management Discharge

 

Discharge Order Complete?     Answers:  Yes                                   

Patient to Obtain             Answers:  Independently                         

Medications                                                                   

Transportation Arranged       Answers:  Family/Friends                        

[facility] Complete               Answers:  No                                    

Case Management Transport     Answers:  No                                    

Form Complete                                                                 

Faxed Final Orders            Answers:  Yes                                   

Agency/Facility Transfer      Answers:  Yes                                   

Report Printed & Faxed to                                                     

Receiving Agency                                                              

Family Notified               Answers:  Yes                                   

Discharge Comments            

Notes:

Pts case discussed w/ Venecia Villanuvea NP. CM spoke to pts brother Maximo on the phone. Maximo is 

agreeable to having pt stay w/ him for a couple of nights until they are finished w/ cleaning out 

her apartment. CM sent final orders to eri along w/ picc line report. erita is delivering 

ivabx to the hospital. Maximo is coming tonight around 6:30PM to pick her up. [facility] has been notified 

of the d/c. LAI Ortiz will hook pt up to ivabx prior to discharging tonight. [facility] will see pt 

tomorrow. CM available for changes.







Plan: [facility]; PT, RN with [employer] for ivabx 

 

Date Signed:  02/18/2019 04:20 PM

Electronically Signed By:PIEDAD Toure

## 2019-02-18 NOTE — PDIAF
- Diagnosis


Diagnosis: Complicated MSSA bloodstream infection with multi-level epidural 

abscesses


Code Status: Full Code





- Medication Management


Long Term Antibiotics: cefazolin 8gm q24 hours by continuous infusion; rifampin 

600mg PO daily


Long Term Antibiotic Stop Date: 03/25/19


Discharge Medications: electronically signed and located in the Home Medication 

List.


PICC Care - Routine: Yes





- Orders


Additional Instructions: 


No bending or twisting


Do not lift greater than 10 pounds








- Labs/Radiology


CBC w/diff Date: 02/25/19 (Weekly while on cefazolin)


CMP Date: 02/25/19 (Weekly while on cefazolin)


Call or Fax Lab and Imaging Results to: [facility]/

o Simone Tran MDFax: [phone]





- Follow Up Care


Current Providers and Referrals: 


Vincent Tillman MD [Medical Doctor] - follow up in 2 weeks


Patient,NotPresent [Unknown] - As per Instructions


Simone Tran MD [Medical Doctor] - 02/27/19 2:00 pm

## 2019-02-18 NOTE — PDIAF
- Diagnosis


Diagnosis: Complicated MSSA bloodstream infection with multi-level epidural 

abscesses


Code Status: Full Code





- Medication Management


Long Term Antibiotics: cefazolin 8gm q24 hours by continuous infusion; rifampin 

600mg PO daily


Long Term Antibiotic Stop Date: 03/25/19


Discharge Medications: electronically signed and located in the Home Medication 

List.


PICC Care - Routine: Yes





- Orders


Services needed: Home Care, Registered Nurse, Certified Nursing Aide, Physical 

Therapy, Occupational Therapy


Home Care Face to Face: I certify that this patient was under my care and that 

I had the required face-to-face encounter meeting the encounter requirements on 

the discharge day.  My findings support the fact that the patient is homebound 

as defined in


Home Care Face to Face Continued: CMS Chapter 7 Medicare Benefits Manual 30.1.1

, The condition of the patient is such that there exists a normal inability to 

leave home and consequently, leaving home would require a considerable and 

taxing effort.


Diet Recommendation: ADA 2000 consistent carb


Diet Texture: Regular Texture Diet


Additional Instructions: 


No bending or twisting


Do not lift greater than 10 pounds


use Tylenol as need for pain, a script for some oxy IR was sent to help if you 

have severe pain


A script for Robaxin was sent to help w back spasms


hold your HCTZ till you see your primary care doctor, your sodium levels 

dropped frequently while on this medication


Take the oral antibiotic Rifampin as ordered














- Labs/Radiology


CBC w/diff Date: 02/25/19 (Weekly while on cefazolin)


CMP Date: 02/25/19 (Weekly while on cefazolin)


Call or Fax Lab and Imaging Results to: [facility]/

o Simone Tran MDFax: [phone]





- Follow Up Care


Current Providers and Referrals: 


Vincent Tillman MD [Medical Doctor] - follow up in 2 weeks


Simone Tran MD [Medical Doctor] - 02/27/19 2:00 pm


Patient,NotPresent [Unknown] - As per Instructions

## 2019-02-18 NOTE — ASDISCHSUM
----------------------------------------------

Discharge Information

----------------------------------------------

Plan Status:Home with Home Health                    Medically Cleared to Leave:02/17/2019

Discharge Date:02/17/2019                            CM D/C Disposition:

ADT D/C Disposition:Home Health Service              Projected Discharge Date:02/18/2019 11:00 AM

Transportation at D/C:                               Discharge Delay Reason:

Follow-Up Date:02/18/2019 11:00 AM                   Discharge Slot:

Final Diagnosis:

----------------------------------------------

Placement Information

----------------------------------------------

Referral Type:[facility]                Referral ID:[id number]

Provider Name:

Address 1:                                           Phone Number:

Address 2:                                           Fax Number:

City:                                                Selection Factors:

State:

 

Referral Type:*Nursing Home/SNF                      Referral ID:[id number]

Provider Name:

Address 1:                                           Phone Number:

Address 2:                                           Fax Number:

City:                                                Selection Factors:

State:

 

Referral Type:*Home Health Care Services             Referral ID:[id number]

Provider Name:[facility]

Address 1:[address]                  Phone [phone]

Address 2:                                           Fax [phone]

City:[place]                                         Selection Factors:

State:[place]

 

Referral Type:Home Infusion                          Referral ID:[id number]

Provider Name:[facility]

Address 1:[address]                Phone [phone]

Address 2:                                           Fax [phone]

City:[place]                                      Selection Factors:

State:[place]

 

----------------------------------------------

Patient Contact Information

----------------------------------------------

Contact Name:ADRIAN                          Relationship:Brother

Address:                                             Home [phone]

                                                     Work Phone:

City:                                                [facility] Phone:

[facility] Code:                                      Email:

----------------------------------------------

Financial Information

----------------------------------------------

Financial Class:Medicaid

[place] Desc[place]        Primary Plan Number:[id number]

Secondary Plan Desc:                                 Secondary Plan Number:

 

 

----------------------------------------------

Assessment Information

----------------------------------------------

----------------------------------------------

[facility] CM Progress Note

----------------------------------------------

CM Note

 

CM Note                       

Notes:

Pt is a 59 yo F presents with hyponatremia, dehydration and elevated WBC.  ID is consulted. MRI 

scheduled. Referral to [facility] submit.  Dispo needs TBD at this time. 



CM to follow. 



Plan: TBD

 

Date Signed:  02/05/2019 11:18 AM

Electronically Signed By:PIEDAD Can

 

 

----------------------------------------------

LACE

----------------------------------------------

ROSEANNA

 

Length of stay for            Answers:  14 days or more                       

current admission                                                             

Acuity / Level of             Answers:  Yes                                   

Care: Did the patient                                                         

have an inpatient                                                             

admission?                                                                    

Comorbidities - select        Answers:  Opioid dependence                     

all that apply                          / Chronic pain                        

                                        Other                         Notes:  HTN

# of Emergency department     Answers:  3-4                                   

visits in the last 6                                                          

months                                                                        

Social determinants           Answers:  Mental health diagnosis               

                                        (anxiety, depression, pers            

                                        onality disorders, etc.)              

Score: 21

 

Date Signed:  02/18/2019 04:24 PM

Electronically Signed By:PIEDAD Toure

 

 

----------------------------------------------

[facility] CM Progress Note

----------------------------------------------

CM Note

 

CM Note                       

Notes:

Spoke with a friend of Torsten today who wanted to give us information about patient's 

circumstances. Rosa Khoury states the patient's apartment is overcrowded with stuff and she 

does not think she can safely return there to convalesce. Virginia ([phone]) also states the 

patient has 2 cats and she is currently trying to feed them and clean the cat pan. However, if she 

is going to be going to rehab for a long period of time after hospitalization, she doesn't know if 

she can care for them the whole time she is gone. Virginia did state she will take care of them for 


now. She reports the cats are very important to the patient as she does not have children or a lot 

of family. Virginia states patient has a sister in [place], and a brother who is close by but 

she is not sure where. CM will follow.

 

Date Signed:  02/06/2019 03:32 PM

Electronically Signed By:Martha Sanders LCSW

 

 

----------------------------------------------

[facility] CM Progress Note

----------------------------------------------

CM Note

 

CM Note                       

Notes:

Patient's white blood cell count still elevated at 27,000. Cultures from chest and back abscesses 

are all pending. Patient needs abx for acute sepsis. Therapies still evaluating. CM will follow.

 

Date Signed:  02/08/2019 11:14 AM

Electronically Signed By:Martha Sanders LCSW

 

 

----------------------------------------------

[facility] CM Progress Note

----------------------------------------------

CM Note

 

CM Note                       

Notes:

Patient was not in room when CM attempted to meet with patient. 



CM discussed case with LAI Red. Patient underwent surgery today to drain abscess  OT 

recommending SNF (2/8), PT recommending SNF vs Inpatient Rehab & patient has been refusing PT 

x2days. Neda states patient is refusing SNF. CM to follow. 



D/C Plan: TBD

 

Date Signed:  02/10/2019 05:22 PM

Electronically Signed By:Jud Cooper

 

 

----------------------------------------------

[facility] CM Progress Note

----------------------------------------------

CM Note

 

CM Note                       

Notes:

Pts case discussed w/ Venecia Villanueva NP. CM met w/ pt for dispo planning. PT as of 2/11 is 

recommending inpatient rehab. CM has been in touch w/ inpatient rehab and they are considering 

her. The earliest she can d/c is early next week. Pt is agreeable to SNF referrals as a secondary 

plan. CM started ultc-100 and submitted it to [facility]. CM to follow.







Plan: TBD

 

Date Signed:  02/12/2019 11:07 AM

Electronically Signed By:PIEDAD Toure

 

 

----------------------------------------------

[facility] Progress Note

----------------------------------------------

CM Note

 

CM Note                       

Notes:

Pts case discussed in tx rounds. CM spoke to Mehreen Bricenolie at inpatient rehab. Mehreen initially 

declined her because she is "too good". [facility], [facility], and [facility] have all said 

no. Rafal from [facility] stopped by and met w/ pt. Ivette will most likely decline because pt is 


58 and will probably not qualify for disability. CM met w/ pt to discuss secondary plan. Pt reports 


that she is unable to get up stairs to her second floor apartment. CM made a referral to [facility]. [facility] 


is able to accept. MORAIMA spoke to Oma and she has already worked w/ her today. Oma will pass 

on in report to work on stairs tomorrow. Drains possibly can come out tomorrow as well. MORAIMA spoke to 


Mehreen again and informed her that pt is unable to do stairs. Mehreen will continue to follow but 

thinks that pt will not qualify. [facility] stopped by and met w/ pt yesterday. CM to follow.







Plan: TBD

 

Date Signed:  02/14/2019 12:36 PM

Electronically Signed By:PIEDAD Toure

 

 

----------------------------------------------

[facility] Progress Note

----------------------------------------------

CM Note

 

CM Note                       

Notes:

CM spoke to Rafal at [facility]. [facility] has declined pt at this time. CM informed pt of this. Pt 

reports that she does not think it is a good idea to be in her home and bed bound. Pt is concerned 

about getting up to her second floor apartment. CM informed PT to work on stairs w/ pt. Pt went to 

[facility] to get a new picc line today. CM spoke to Dr. Tran and pt will require ivabx at time of 

d/c. CM sent a referral to [facility]. CM discussed pts case w/ Venecia Villanueva. Pts drains are pulled 


today. CM spoke to pts sister Sofia that lives in [place]. Sofia reports that their two brothers 

will come and clean pts apartment tomorrow and on Sunday. Sofia is concerned about pts mental 

health. CM provided Sofia w/ the website to psychology today for her to find a therapist for 

pt. CM spoke to Jackie about this case yesterday. CM to follow.







Plan: [facility], PT, OT, RN, CNA w/ [facility] and [facility] follow up

 

Date Signed:  02/15/2019 11:31 AM

Electronically Signed By:PIEDAD Toure

 

 

----------------------------------------------

[facility] MORAIMA Progress Note

----------------------------------------------

CM Note

 

CM Note                       

Notes:

Reviewed chart, spoke with ZEB Contreras and LAI Max regarding discharge plan of care, pt's 

progress. Per Winter, pt was previously planning to discharge home to her brother's house with 

[facility] (RN/PT/OT) and [facility] for 6 wks of IV infusion. Per Winter, today the pt 


had a falling out with her brother. It is unclear if the pt can discharge to his house or if she 

will need to return to her own home. Pt's sister reports that the pt's apartment is unsafe and 

unsanitary. The pt's sister is concerned about the pt's mental health.



Multiple staff recommend pt discharge to SNF secondary to safety concerns. Per prior CM notes, the 

pt has been declined at [facility] secondary to pt's inability to 

qualify for disability and limited income sources. [facility] 100 process previously started.



Pt has a neighbor caring for her cats while she is the hospital. Per notes, if pt discharges to SNF 


for 30 days, the neighbor cannot continue to care for them. Discharge plan remains unclear at this 

time. CM will continue to follow.



Discharge Plan: To be determined

 

Date Signed:  02/17/2019 04:25 PM

Electronically Signed By:Mi Mendosa RN

 

 

----------------------------------------------

Case Management Discharge Plan Note

----------------------------------------------

Case Management Discharge

 

Discharge Order Complete?     Answers:  Yes                                   

Patient to Obtain             Answers:  Independently                         

Medications                                                                   

Transportation Arranged       Answers:  Family/Friends                        

[facility] Complete               Answers:  No                                    

Case Management Transport     Answers:  No                                    

Form Complete                                                                 

Faxed Final Orders            Answers:  Yes                                   

Agency/Facility Transfer      Answers:  Yes                                   

Report Printed & Faxed to                                                     

Receiving Agency                                                              

Family Notified               Answers:  Yes                                   

Discharge Comments            

Notes:

Pts case discussed w/ Venecia Villanueva NP. MORAIMA spoke to pts brother Maximo on the phone. Maximo is 

agreeable to having pt stay w/ him for a couple of nights until they are finished w/ cleaning out 

her apartment. MORAIMA sent final orders to [facility] along w/ picc line report. [facility] is delivering 

ivabx to the hospital. Maximo is coming tonight around 6:30PM to pick her up. [facility] has been notified 

of the d/c. LAI Ortiz will hook pt up to ivabx prior to discharging tonight. [facility] will see pt 

tomorrow. CM available for changes.







Plan: [facility]; PT, RN with [facility] for ivabx 

 

Date Signed:  02/18/2019 04:20 PM

Electronically Signed By:PIEDAD Toure

 

 

----------------------------------------------

Intervention Information

----------------------------------------------